# Patient Record
Sex: FEMALE | Race: WHITE | NOT HISPANIC OR LATINO | Employment: OTHER | URBAN - METROPOLITAN AREA
[De-identification: names, ages, dates, MRNs, and addresses within clinical notes are randomized per-mention and may not be internally consistent; named-entity substitution may affect disease eponyms.]

---

## 2018-07-24 ENCOUNTER — OFFICE VISIT (OUTPATIENT)
Dept: URGENT CARE | Facility: CLINIC | Age: 73
End: 2018-07-24
Payer: MEDICARE

## 2018-07-24 VITALS
TEMPERATURE: 98 F | SYSTOLIC BLOOD PRESSURE: 140 MMHG | HEART RATE: 60 BPM | BODY MASS INDEX: 45.8 KG/M2 | OXYGEN SATURATION: 97 % | HEIGHT: 66 IN | RESPIRATION RATE: 14 BRPM | DIASTOLIC BLOOD PRESSURE: 66 MMHG | WEIGHT: 285 LBS

## 2018-07-24 DIAGNOSIS — H92.01 OTALGIA, RIGHT EAR: Primary | ICD-10-CM

## 2018-07-24 DIAGNOSIS — H60.521: ICD-10-CM

## 2018-07-24 PROCEDURE — 99213 OFFICE O/P EST LOW 20 MIN: CPT | Mod: S$GLB,,, | Performed by: FAMILY MEDICINE

## 2018-07-24 RX ORDER — LISINOPRIL 10 MG/1
20 TABLET ORAL
COMMUNITY
Start: 2018-07-16 | End: 2021-05-06 | Stop reason: SDUPTHER

## 2018-07-24 RX ORDER — NEOMYCIN SULFATE, POLYMYXIN B SULFATE, HYDROCORTISONE 3.5; 10000; 1 MG/ML; [USP'U]/ML; MG/ML
3 SOLUTION/ DROPS AURICULAR (OTIC) 3 TIMES DAILY
Qty: 10 ML | Refills: 0 | Status: SHIPPED | OUTPATIENT
Start: 2018-07-24 | End: 2018-08-03

## 2018-07-24 RX ORDER — ALLOPURINOL 300 MG/1
100 TABLET ORAL
COMMUNITY
Start: 2018-07-16 | End: 2021-05-06 | Stop reason: SDUPTHER

## 2018-07-24 RX ORDER — TIZANIDINE 4 MG/1
TABLET ORAL
COMMUNITY
Start: 2018-07-16 | End: 2021-05-06 | Stop reason: SDUPTHER

## 2018-07-24 RX ORDER — TRAMADOL HYDROCHLORIDE 50 MG/1
TABLET ORAL
COMMUNITY
Start: 2018-07-12 | End: 2023-11-10

## 2018-07-24 RX ORDER — TRIAMTERENE AND HYDROCHLOROTHIAZIDE 37.5; 25 MG/1; MG/1
CAPSULE ORAL
COMMUNITY
Start: 2018-07-16 | End: 2021-05-06 | Stop reason: SDUPTHER

## 2018-07-24 RX ORDER — NABUMETONE 750 MG/1
TABLET, FILM COATED ORAL
COMMUNITY
Start: 2018-07-16 | End: 2022-01-14 | Stop reason: SDUPTHER

## 2018-07-24 RX ORDER — LEVOTHYROXINE SODIUM 175 UG/1
TABLET ORAL
COMMUNITY
Start: 2018-07-16 | End: 2021-05-06 | Stop reason: SDUPTHER

## 2018-07-24 RX ORDER — PANTOPRAZOLE SODIUM 40 MG/1
TABLET, DELAYED RELEASE ORAL
COMMUNITY
Start: 2018-07-06 | End: 2021-05-06 | Stop reason: ALTCHOICE

## 2018-07-24 RX ORDER — PAROXETINE HYDROCHLORIDE 20 MG/1
TABLET, FILM COATED ORAL
COMMUNITY
Start: 2018-06-18 | End: 2021-05-06 | Stop reason: SDUPTHER

## 2018-07-24 RX ORDER — CLINDAMYCIN HYDROCHLORIDE 300 MG/1
CAPSULE ORAL
Refills: 1 | COMMUNITY
Start: 2018-07-18 | End: 2021-05-06 | Stop reason: ALTCHOICE

## 2018-07-24 NOTE — PROGRESS NOTES
"Subjective:       Patient ID: Roxanne Caballero is a 73 y.o. female.    Vitals:  height is 5' 6" (1.676 m) and weight is 129.3 kg (285 lb). Her oral temperature is 98 °F (36.7 °C). Her blood pressure is 140/66 (abnormal) and her pulse is 60. Her respiration is 14 and oxygen saturation is 97%.     Chief Complaint: Otalgia    R ear pain and swelling for 5 days. Pt taking Clindamycin for infected R tooth.      Otalgia    There is pain in the right ear. This is a new problem. The current episode started in the past 7 days. The problem occurs constantly. The problem has been unchanged. There has been no fever. The pain is at a severity of 3/10. The pain is mild. Associated symptoms include hearing loss. Pertinent negatives include no abdominal pain, diarrhea, headaches, rash, sore throat or vomiting. She has tried antibiotics for the symptoms. The treatment provided no relief. There is no history of a chronic ear infection, hearing loss or a tympanostomy tube.     Review of Systems   Constitution: Negative for chills and fever.   HENT: Positive for ear pain and hearing loss. Negative for sore throat.    Eyes: Negative for blurred vision.   Cardiovascular: Negative for chest pain.   Respiratory: Negative for shortness of breath.    Skin: Negative for rash.   Musculoskeletal: Negative for back pain and joint pain.   Gastrointestinal: Negative for abdominal pain, diarrhea, nausea and vomiting.   Neurological: Negative for headaches.   Psychiatric/Behavioral: The patient is not nervous/anxious.        Objective:      Physical Exam   Constitutional: She is oriented to person, place, and time. She appears well-developed and well-nourished. She is cooperative.  Non-toxic appearance. She does not appear ill. No distress.   HENT:   Head: Normocephalic and atraumatic.   Right Ear: Hearing, tympanic membrane, external ear and ear canal normal.   Left Ear: Hearing, tympanic membrane, external ear and ear canal normal.   Nose: Nose " normal. No mucosal edema, rhinorrhea or nasal deformity. No epistaxis. Right sinus exhibits no maxillary sinus tenderness and no frontal sinus tenderness. Left sinus exhibits no maxillary sinus tenderness and no frontal sinus tenderness.   Mouth/Throat: Uvula is midline, oropharynx is clear and moist and mucous membranes are normal. No trismus in the jaw. Normal dentition. No uvula swelling. No posterior oropharyngeal erythema.   Right ear canal edematous tm not visible  Left ear canal normal  TM normal   Eyes: Conjunctivae and lids are normal. Right eye exhibits no discharge. Left eye exhibits no discharge. No scleral icterus.   Sclera clear bilat   Neck: Trachea normal, normal range of motion, full passive range of motion without pain and phonation normal. Neck supple.   Cardiovascular: Normal rate, regular rhythm, normal heart sounds, intact distal pulses and normal pulses.    Pulmonary/Chest: Effort normal and breath sounds normal. No respiratory distress.   Abdominal: Soft. Normal appearance and bowel sounds are normal. She exhibits no distension, no pulsatile midline mass and no mass. There is no tenderness.   Musculoskeletal: Normal range of motion. She exhibits no edema or deformity.   Neurological: She is alert and oriented to person, place, and time. She exhibits normal muscle tone. Coordination normal.   Skin: Skin is warm, dry and intact. She is not diaphoretic. No pallor.   Psychiatric: She has a normal mood and affect. Her speech is normal and behavior is normal. Judgment and thought content normal. Cognition and memory are normal.   Nursing note and vitals reviewed.      Assessment:       1. Otalgia, right ear    2. Acute chemical otitis externa, right        Plan:         Otalgia, right ear  -     neomycin-polymyxin-hydrocortisone (CORTISPORIN) otic solution; Place 3 drops into both ears 3 (three) times daily. for 10 days  Dispense: 10 mL; Refill: 0    Acute chemical otitis externa, right  -      neomycin-polymyxin-hydrocortisone (CORTISPORIN) otic solution; Place 3 drops into both ears 3 (three) times daily. for 10 days  Dispense: 10 mL; Refill: 0        Warm compresses

## 2018-07-24 NOTE — PATIENT INSTRUCTIONS

## 2021-03-05 ENCOUNTER — IMMUNIZATION (OUTPATIENT)
Dept: PRIMARY CARE CLINIC | Facility: CLINIC | Age: 76
End: 2021-03-05
Payer: MEDICARE

## 2021-03-05 DIAGNOSIS — Z23 NEED FOR VACCINATION: Primary | ICD-10-CM

## 2021-03-05 PROCEDURE — 0031A PR IMMUNIZ ADMIN, SARS-COV-2 COVID-19 VACC, 5X10VP/0.5ML: CPT | Mod: CV19,S$GLB,, | Performed by: INTERNAL MEDICINE

## 2021-03-05 PROCEDURE — 91303 PR SARSCOV2 VAC AD26 .5ML IM: CPT | Mod: S$GLB,,, | Performed by: INTERNAL MEDICINE

## 2021-03-05 PROCEDURE — 0031A PR IMMUNIZ ADMIN, SARS-COV-2 COVID-19 VACC, 5X10VP/0.5ML: ICD-10-PCS | Mod: CV19,S$GLB,, | Performed by: INTERNAL MEDICINE

## 2021-03-05 PROCEDURE — 91303 PR SARSCOV2 VAC AD26 .5ML IM: ICD-10-PCS | Mod: S$GLB,,, | Performed by: INTERNAL MEDICINE

## 2021-05-06 ENCOUNTER — OFFICE VISIT (OUTPATIENT)
Dept: FAMILY MEDICINE | Facility: CLINIC | Age: 76
End: 2021-05-06
Payer: MEDICARE

## 2021-05-06 ENCOUNTER — HOSPITAL ENCOUNTER (OUTPATIENT)
Dept: RADIOLOGY | Facility: HOSPITAL | Age: 76
Discharge: HOME | End: 2021-05-06
Attending: STUDENT IN AN ORGANIZED HEALTH CARE EDUCATION/TRAINING PROGRAM
Payer: MEDICARE

## 2021-05-06 VITALS
BODY MASS INDEX: 46.67 KG/M2 | OXYGEN SATURATION: 95 % | DIASTOLIC BLOOD PRESSURE: 60 MMHG | WEIGHT: 290.38 LBS | HEART RATE: 86 BPM | SYSTOLIC BLOOD PRESSURE: 130 MMHG | HEIGHT: 66 IN

## 2021-05-06 DIAGNOSIS — M25.511 CHRONIC RIGHT SHOULDER PAIN: ICD-10-CM

## 2021-05-06 DIAGNOSIS — M10.9 GOUT, UNSPECIFIED CAUSE, UNSPECIFIED CHRONICITY, UNSPECIFIED SITE: ICD-10-CM

## 2021-05-06 DIAGNOSIS — I10 ESSENTIAL HYPERTENSION: ICD-10-CM

## 2021-05-06 DIAGNOSIS — Z11.59 NEED FOR HEPATITIS C SCREENING TEST: ICD-10-CM

## 2021-05-06 DIAGNOSIS — E78.5 HYPERLIPIDEMIA, UNSPECIFIED HYPERLIPIDEMIA TYPE: ICD-10-CM

## 2021-05-06 DIAGNOSIS — G89.29 CHRONIC RIGHT SHOULDER PAIN: ICD-10-CM

## 2021-05-06 DIAGNOSIS — M19.90 OSTEOARTHRITIS, UNSPECIFIED OSTEOARTHRITIS TYPE, UNSPECIFIED SITE: ICD-10-CM

## 2021-05-06 DIAGNOSIS — Z76.89 ESTABLISHING CARE WITH NEW DOCTOR, ENCOUNTER FOR: Primary | ICD-10-CM

## 2021-05-06 DIAGNOSIS — F41.9 ANXIETY: ICD-10-CM

## 2021-05-06 DIAGNOSIS — E03.9 HYPOTHYROIDISM, UNSPECIFIED TYPE: ICD-10-CM

## 2021-05-06 PROCEDURE — 3288F FALL RISK ASSESSMENT DOCD: CPT | Mod: CPTII,S$GLB,, | Performed by: STUDENT IN AN ORGANIZED HEALTH CARE EDUCATION/TRAINING PROGRAM

## 2021-05-06 PROCEDURE — 99204 OFFICE O/P NEW MOD 45 MIN: CPT | Mod: S$GLB,,, | Performed by: STUDENT IN AN ORGANIZED HEALTH CARE EDUCATION/TRAINING PROGRAM

## 2021-05-06 PROCEDURE — 1125F AMNT PAIN NOTED PAIN PRSNT: CPT | Mod: S$GLB,,, | Performed by: STUDENT IN AN ORGANIZED HEALTH CARE EDUCATION/TRAINING PROGRAM

## 2021-05-06 PROCEDURE — 99204 PR OFFICE/OUTPT VISIT, NEW, LEVL IV, 45-59 MIN: ICD-10-PCS | Mod: S$GLB,,, | Performed by: STUDENT IN AN ORGANIZED HEALTH CARE EDUCATION/TRAINING PROGRAM

## 2021-05-06 PROCEDURE — 1125F PR PAIN SEVERITY QUANTIFIED, PAIN PRESENT: ICD-10-PCS | Mod: S$GLB,,, | Performed by: STUDENT IN AN ORGANIZED HEALTH CARE EDUCATION/TRAINING PROGRAM

## 2021-05-06 PROCEDURE — 99999 PR PBB SHADOW E&M-EST. PATIENT-LVL IV: CPT | Mod: PBBFAC,,, | Performed by: STUDENT IN AN ORGANIZED HEALTH CARE EDUCATION/TRAINING PROGRAM

## 2021-05-06 PROCEDURE — 99499 RISK ADDL DX/OHS AUDIT: ICD-10-PCS | Mod: S$GLB,,, | Performed by: STUDENT IN AN ORGANIZED HEALTH CARE EDUCATION/TRAINING PROGRAM

## 2021-05-06 PROCEDURE — 73030 X-RAY EXAM OF SHOULDER: CPT | Mod: 26,RT,, | Performed by: RADIOLOGY

## 2021-05-06 PROCEDURE — 1159F PR MEDICATION LIST DOCUMENTED IN MEDICAL RECORD: ICD-10-PCS | Mod: S$GLB,,, | Performed by: STUDENT IN AN ORGANIZED HEALTH CARE EDUCATION/TRAINING PROGRAM

## 2021-05-06 PROCEDURE — 1159F MED LIST DOCD IN RCRD: CPT | Mod: S$GLB,,, | Performed by: STUDENT IN AN ORGANIZED HEALTH CARE EDUCATION/TRAINING PROGRAM

## 2021-05-06 PROCEDURE — 3288F PR FALLS RISK ASSESSMENT DOCUMENTED: ICD-10-PCS | Mod: CPTII,S$GLB,, | Performed by: STUDENT IN AN ORGANIZED HEALTH CARE EDUCATION/TRAINING PROGRAM

## 2021-05-06 PROCEDURE — 73030 XR SHOULDER TRAUMA 3 VIEW RIGHT: ICD-10-PCS | Mod: 26,RT,, | Performed by: RADIOLOGY

## 2021-05-06 PROCEDURE — 99999 PR PBB SHADOW E&M-EST. PATIENT-LVL IV: ICD-10-PCS | Mod: PBBFAC,,, | Performed by: STUDENT IN AN ORGANIZED HEALTH CARE EDUCATION/TRAINING PROGRAM

## 2021-05-06 PROCEDURE — 1101F PR PT FALLS ASSESS DOC 0-1 FALLS W/OUT INJ PAST YR: ICD-10-PCS | Mod: CPTII,S$GLB,, | Performed by: STUDENT IN AN ORGANIZED HEALTH CARE EDUCATION/TRAINING PROGRAM

## 2021-05-06 PROCEDURE — 73030 X-RAY EXAM OF SHOULDER: CPT | Mod: TC,FY,PO,RT

## 2021-05-06 PROCEDURE — 1101F PT FALLS ASSESS-DOCD LE1/YR: CPT | Mod: CPTII,S$GLB,, | Performed by: STUDENT IN AN ORGANIZED HEALTH CARE EDUCATION/TRAINING PROGRAM

## 2021-05-06 PROCEDURE — 99499 UNLISTED E&M SERVICE: CPT | Mod: S$GLB,,, | Performed by: STUDENT IN AN ORGANIZED HEALTH CARE EDUCATION/TRAINING PROGRAM

## 2021-05-06 RX ORDER — ALLOPURINOL 300 MG/1
150 TABLET ORAL DAILY
Qty: 30 TABLET | Refills: 1 | Status: SHIPPED | OUTPATIENT
Start: 2021-05-06 | End: 2021-06-16

## 2021-05-06 RX ORDER — LISINOPRIL 20 MG/1
20 TABLET ORAL DAILY
Qty: 90 TABLET | Refills: 2 | Status: SHIPPED | OUTPATIENT
Start: 2021-05-06 | End: 2022-01-14 | Stop reason: SDUPTHER

## 2021-05-06 RX ORDER — TIZANIDINE 4 MG/1
4 TABLET ORAL DAILY
Qty: 30 TABLET | Refills: 1 | Status: SHIPPED | OUTPATIENT
Start: 2021-05-06 | End: 2021-06-28

## 2021-05-06 RX ORDER — OMEPRAZOLE 20 MG/1
20 CAPSULE, DELAYED RELEASE ORAL DAILY
COMMUNITY

## 2021-05-06 RX ORDER — TRIAMTERENE AND HYDROCHLOROTHIAZIDE 37.5; 25 MG/1; MG/1
1 CAPSULE ORAL DAILY
Qty: 90 CAPSULE | Refills: 1 | Status: SHIPPED | OUTPATIENT
Start: 2021-05-06 | End: 2021-10-16

## 2021-05-06 RX ORDER — PAROXETINE HYDROCHLORIDE 20 MG/1
20 TABLET, FILM COATED ORAL DAILY
Qty: 30 TABLET | Refills: 2 | Status: SHIPPED | OUTPATIENT
Start: 2021-05-06 | End: 2021-07-19

## 2021-05-06 RX ORDER — LEVOTHYROXINE SODIUM 175 UG/1
175 TABLET ORAL DAILY
Qty: 90 TABLET | Refills: 2 | Status: SHIPPED | OUTPATIENT
Start: 2021-05-06 | End: 2022-01-14 | Stop reason: SDUPTHER

## 2021-05-07 ENCOUNTER — LAB VISIT (OUTPATIENT)
Dept: LAB | Facility: HOSPITAL | Age: 76
End: 2021-05-07
Attending: STUDENT IN AN ORGANIZED HEALTH CARE EDUCATION/TRAINING PROGRAM
Payer: MEDICARE

## 2021-05-07 DIAGNOSIS — E78.5 HYPERLIPIDEMIA, UNSPECIFIED HYPERLIPIDEMIA TYPE: ICD-10-CM

## 2021-05-07 DIAGNOSIS — I10 ESSENTIAL HYPERTENSION: ICD-10-CM

## 2021-05-07 DIAGNOSIS — Z11.59 NEED FOR HEPATITIS C SCREENING TEST: ICD-10-CM

## 2021-05-07 LAB
ALBUMIN SERPL BCP-MCNC: 3.6 G/DL (ref 3.5–5.2)
ALP SERPL-CCNC: 111 U/L (ref 55–135)
ALT SERPL W/O P-5'-P-CCNC: 18 U/L (ref 10–44)
ANION GAP SERPL CALC-SCNC: 9 MMOL/L (ref 8–16)
AST SERPL-CCNC: 28 U/L (ref 10–40)
BILIRUB SERPL-MCNC: 0.4 MG/DL (ref 0.1–1)
BUN SERPL-MCNC: 41 MG/DL (ref 8–23)
CALCIUM SERPL-MCNC: 9.9 MG/DL (ref 8.7–10.5)
CHLORIDE SERPL-SCNC: 103 MMOL/L (ref 95–110)
CHOLEST SERPL-MCNC: 156 MG/DL (ref 120–199)
CHOLEST/HDLC SERPL: 3.2 {RATIO} (ref 2–5)
CO2 SERPL-SCNC: 25 MMOL/L (ref 23–29)
CREAT SERPL-MCNC: 2.1 MG/DL (ref 0.5–1.4)
EST. GFR  (AFRICAN AMERICAN): 25.4 ML/MIN/1.73 M^2
EST. GFR  (NON AFRICAN AMERICAN): 22.1 ML/MIN/1.73 M^2
GLUCOSE SERPL-MCNC: 99 MG/DL (ref 70–110)
HDLC SERPL-MCNC: 49 MG/DL (ref 40–75)
HDLC SERPL: 31.4 % (ref 20–50)
LDLC SERPL CALC-MCNC: 80.8 MG/DL (ref 63–159)
NONHDLC SERPL-MCNC: 107 MG/DL
POTASSIUM SERPL-SCNC: 5.1 MMOL/L (ref 3.5–5.1)
PROT SERPL-MCNC: 7.7 G/DL (ref 6–8.4)
SODIUM SERPL-SCNC: 137 MMOL/L (ref 136–145)
TRIGL SERPL-MCNC: 131 MG/DL (ref 30–150)

## 2021-05-07 PROCEDURE — 86803 HEPATITIS C AB TEST: CPT | Performed by: STUDENT IN AN ORGANIZED HEALTH CARE EDUCATION/TRAINING PROGRAM

## 2021-05-07 PROCEDURE — 80061 LIPID PANEL: CPT | Performed by: STUDENT IN AN ORGANIZED HEALTH CARE EDUCATION/TRAINING PROGRAM

## 2021-05-07 PROCEDURE — 36415 COLL VENOUS BLD VENIPUNCTURE: CPT | Mod: PO | Performed by: STUDENT IN AN ORGANIZED HEALTH CARE EDUCATION/TRAINING PROGRAM

## 2021-05-07 PROCEDURE — 80053 COMPREHEN METABOLIC PANEL: CPT | Performed by: STUDENT IN AN ORGANIZED HEALTH CARE EDUCATION/TRAINING PROGRAM

## 2021-05-10 ENCOUNTER — TELEPHONE (OUTPATIENT)
Dept: FAMILY MEDICINE | Facility: CLINIC | Age: 76
End: 2021-05-10

## 2021-05-10 LAB — HCV AB SERPL QL IA: NEGATIVE

## 2021-06-14 ENCOUNTER — HOSPITAL ENCOUNTER (OUTPATIENT)
Dept: RADIOLOGY | Facility: HOSPITAL | Age: 76
Discharge: HOME | End: 2021-06-14
Attending: STUDENT IN AN ORGANIZED HEALTH CARE EDUCATION/TRAINING PROGRAM
Payer: MEDICARE

## 2021-06-14 ENCOUNTER — OFFICE VISIT (OUTPATIENT)
Dept: SPORTS MEDICINE | Facility: CLINIC | Age: 76
End: 2021-06-14
Payer: MEDICARE

## 2021-06-14 VITALS
BODY MASS INDEX: 46.67 KG/M2 | HEIGHT: 66 IN | WEIGHT: 290.38 LBS | DIASTOLIC BLOOD PRESSURE: 62 MMHG | SYSTOLIC BLOOD PRESSURE: 111 MMHG

## 2021-06-14 DIAGNOSIS — M25.511 CHRONIC RIGHT SHOULDER PAIN: Primary | ICD-10-CM

## 2021-06-14 DIAGNOSIS — M25.512 ACUTE PAIN OF LEFT SHOULDER: ICD-10-CM

## 2021-06-14 DIAGNOSIS — M19.012 ARTHRITIS OF LEFT GLENOHUMERAL JOINT: ICD-10-CM

## 2021-06-14 DIAGNOSIS — M75.42 SUBACROMIAL IMPINGEMENT, LEFT: ICD-10-CM
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
## 2021-06-14 DIAGNOSIS — G89.29 CHRONIC RIGHT SHOULDER PAIN: Primary | ICD-10-CM

## 2021-06-14 DIAGNOSIS — M19.011 ARTHRITIS OF RIGHT GLENOHUMERAL JOINT: ICD-10-CM

## 2021-06-14 PROCEDURE — 99204 PR OFFICE/OUTPT VISIT, NEW, LEVL IV, 45-59 MIN: ICD-10-PCS | Mod: 25,S$GLB,, | Performed by: STUDENT IN AN ORGANIZED HEALTH CARE EDUCATION/TRAINING PROGRAM

## 2021-06-14 PROCEDURE — 20611 LARGE JOINT ASPIRATION/INJECTION: R GLENOHUMERAL: ICD-10-PCS | Mod: RT,S$GLB,, | Performed by: STUDENT IN AN ORGANIZED HEALTH CARE EDUCATION/TRAINING PROGRAM

## 2021-06-14 PROCEDURE — 99204 OFFICE O/P NEW MOD 45 MIN: CPT | Mod: 25,S$GLB,, | Performed by: STUDENT IN AN ORGANIZED HEALTH CARE EDUCATION/TRAINING PROGRAM

## 2021-06-14 PROCEDURE — 73030 X-RAY EXAM OF SHOULDER: CPT | Mod: TC,PO,LT

## 2021-06-14 PROCEDURE — 73030 XR SHOULDER COMPLETE 2 OR MORE VIEWS LEFT: ICD-10-PCS | Mod: 26,LT,, | Performed by: RADIOLOGY

## 2021-06-14 PROCEDURE — 97110 PR THERAPEUTIC EXERCISES: ICD-10-PCS | Mod: GP,S$GLB,, | Performed by: STUDENT IN AN ORGANIZED HEALTH CARE EDUCATION/TRAINING PROGRAM

## 2021-06-14 PROCEDURE — 97110 THERAPEUTIC EXERCISES: CPT | Mod: GP,S$GLB,, | Performed by: STUDENT IN AN ORGANIZED HEALTH CARE EDUCATION/TRAINING PROGRAM

## 2021-06-14 PROCEDURE — 99999 PR PBB SHADOW E&M-EST. PATIENT-LVL III: ICD-10-PCS | Mod: PBBFAC,,, | Performed by: STUDENT IN AN ORGANIZED HEALTH CARE EDUCATION/TRAINING PROGRAM

## 2021-06-14 PROCEDURE — 1159F MED LIST DOCD IN RCRD: CPT | Mod: S$GLB,,, | Performed by: STUDENT IN AN ORGANIZED HEALTH CARE EDUCATION/TRAINING PROGRAM

## 2021-06-14 PROCEDURE — 73030 X-RAY EXAM OF SHOULDER: CPT | Mod: 26,LT,, | Performed by: RADIOLOGY

## 2021-06-14 PROCEDURE — 1159F PR MEDICATION LIST DOCUMENTED IN MEDICAL RECORD: ICD-10-PCS | Mod: S$GLB,,, | Performed by: STUDENT IN AN ORGANIZED HEALTH CARE EDUCATION/TRAINING PROGRAM

## 2021-06-14 PROCEDURE — 99999 PR PBB SHADOW E&M-EST. PATIENT-LVL III: CPT | Mod: PBBFAC,,, | Performed by: STUDENT IN AN ORGANIZED HEALTH CARE EDUCATION/TRAINING PROGRAM

## 2021-06-14 PROCEDURE — 20611 DRAIN/INJ JOINT/BURSA W/US: CPT | Mod: RT,S$GLB,, | Performed by: STUDENT IN AN ORGANIZED HEALTH CARE EDUCATION/TRAINING PROGRAM

## 2021-06-14 RX ADMIN — TRIAMCINOLONE ACETONIDE 40 MG: 40 INJECTION, SUSPENSION INTRA-ARTICULAR; INTRAMUSCULAR at 02:06

## 2021-06-16 RX ORDER — TRIAMCINOLONE ACETONIDE 40 MG/ML
40 INJECTION, SUSPENSION INTRA-ARTICULAR; INTRAMUSCULAR
Status: DISCONTINUED | OUTPATIENT
Start: 2021-06-14 | End: 2021-06-16 | Stop reason: HOSPADM

## 2021-12-08 ENCOUNTER — TELEPHONE (OUTPATIENT)
Dept: INTERNAL MEDICINE | Facility: CLINIC | Age: 76
End: 2021-12-08
Payer: MEDICARE

## 2021-12-28 ENCOUNTER — TELEPHONE (OUTPATIENT)
Dept: FAMILY MEDICINE | Facility: CLINIC | Age: 76
End: 2021-12-28
Payer: MEDICARE

## 2022-01-14 ENCOUNTER — LAB VISIT (OUTPATIENT)
Dept: LAB | Facility: HOSPITAL | Age: 77
End: 2022-01-14
Attending: FAMILY MEDICINE
Payer: MEDICARE

## 2022-01-14 ENCOUNTER — OFFICE VISIT (OUTPATIENT)
Dept: FAMILY MEDICINE | Facility: CLINIC | Age: 77
End: 2022-01-14
Payer: MEDICARE

## 2022-01-14 VITALS
BODY MASS INDEX: 45.24 KG/M2 | HEIGHT: 66 IN | HEART RATE: 74 BPM | OXYGEN SATURATION: 95 % | WEIGHT: 281.5 LBS | DIASTOLIC BLOOD PRESSURE: 60 MMHG | SYSTOLIC BLOOD PRESSURE: 124 MMHG

## 2022-01-14 DIAGNOSIS — N18.4 HYPERTENSIVE KIDNEY DISEASE WITH STAGE 4 CHRONIC KIDNEY DISEASE: ICD-10-CM

## 2022-01-14 DIAGNOSIS — G89.29 CHRONIC RIGHT SHOULDER PAIN: ICD-10-CM

## 2022-01-14 DIAGNOSIS — N18.4 CKD (CHRONIC KIDNEY DISEASE) STAGE 4, GFR 15-29 ML/MIN: ICD-10-CM

## 2022-01-14 DIAGNOSIS — I12.9 HYPERTENSIVE KIDNEY DISEASE WITH STAGE 4 CHRONIC KIDNEY DISEASE: ICD-10-CM

## 2022-01-14 DIAGNOSIS — F41.9 ANXIETY: ICD-10-CM

## 2022-01-14 DIAGNOSIS — E03.9 HYPOTHYROIDISM, UNSPECIFIED TYPE: ICD-10-CM

## 2022-01-14 DIAGNOSIS — J44.9 CHRONIC OBSTRUCTIVE PULMONARY DISEASE, UNSPECIFIED COPD TYPE: ICD-10-CM

## 2022-01-14 DIAGNOSIS — E66.01 CLASS 3 SEVERE OBESITY DUE TO EXCESS CALORIES WITH SERIOUS COMORBIDITY AND BODY MASS INDEX (BMI) OF 45.0 TO 49.9 IN ADULT: ICD-10-CM

## 2022-01-14 DIAGNOSIS — M25.511 CHRONIC RIGHT SHOULDER PAIN: ICD-10-CM

## 2022-01-14 DIAGNOSIS — M19.90 OSTEOARTHRITIS, UNSPECIFIED OSTEOARTHRITIS TYPE, UNSPECIFIED SITE: ICD-10-CM

## 2022-01-14 DIAGNOSIS — Z23 IMMUNIZATION DUE: ICD-10-CM

## 2022-01-14 DIAGNOSIS — K04.7 CHRONIC DENTAL INFECTION: ICD-10-CM

## 2022-01-14 DIAGNOSIS — Z00.01 ENCOUNTER FOR GENERAL ADULT MEDICAL EXAMINATION WITH ABNORMAL FINDINGS: Primary | ICD-10-CM

## 2022-01-14 PROBLEM — N18.30 STAGE 3 CHRONIC KIDNEY DISEASE: Status: ACTIVE | Noted: 2022-01-14

## 2022-01-14 PROBLEM — M10.9 GOUT: Status: ACTIVE | Noted: 2022-01-14

## 2022-01-14 PROBLEM — F41.1 GENERALIZED ANXIETY DISORDER: Status: ACTIVE | Noted: 2022-01-14

## 2022-01-14 PROBLEM — I10 HYPERTENSION: Status: ACTIVE | Noted: 2022-01-14

## 2022-01-14 PROBLEM — E66.813 CLASS 3 SEVERE OBESITY DUE TO EXCESS CALORIES WITH SERIOUS COMORBIDITY AND BODY MASS INDEX (BMI) OF 45.0 TO 49.9 IN ADULT: Status: ACTIVE | Noted: 2022-01-14

## 2022-01-14 PROBLEM — M25.559 ARTHRALGIA OF HIP: Status: ACTIVE | Noted: 2022-01-14

## 2022-01-14 PROCEDURE — 3288F FALL RISK ASSESSMENT DOCD: CPT | Mod: HCNC,CPTII,S$GLB, | Performed by: FAMILY MEDICINE

## 2022-01-14 PROCEDURE — 84443 ASSAY THYROID STIM HORMONE: CPT | Mod: HCNC | Performed by: FAMILY MEDICINE

## 2022-01-14 PROCEDURE — 99499 UNLISTED E&M SERVICE: CPT | Mod: HCNC,S$GLB,, | Performed by: FAMILY MEDICINE

## 2022-01-14 PROCEDURE — 90694 FLU VACCINE - QUADRIVALENT - ADJUVANTED: ICD-10-PCS | Mod: HCNC,S$GLB,, | Performed by: FAMILY MEDICINE

## 2022-01-14 PROCEDURE — 3288F PR FALLS RISK ASSESSMENT DOCUMENTED: ICD-10-PCS | Mod: HCNC,CPTII,S$GLB, | Performed by: FAMILY MEDICINE

## 2022-01-14 PROCEDURE — 1125F AMNT PAIN NOTED PAIN PRSNT: CPT | Mod: HCNC,CPTII,S$GLB, | Performed by: FAMILY MEDICINE

## 2022-01-14 PROCEDURE — 80053 COMPREHEN METABOLIC PANEL: CPT | Mod: HCNC | Performed by: FAMILY MEDICINE

## 2022-01-14 PROCEDURE — G0008 ADMIN INFLUENZA VIRUS VAC: HCPCS | Mod: HCNC,S$GLB,, | Performed by: FAMILY MEDICINE

## 2022-01-14 PROCEDURE — 83036 HEMOGLOBIN GLYCOSYLATED A1C: CPT | Mod: HCNC | Performed by: FAMILY MEDICINE

## 2022-01-14 PROCEDURE — 99397 PR PREVENTIVE VISIT,EST,65 & OVER: ICD-10-PCS | Mod: HCNC,S$GLB,, | Performed by: FAMILY MEDICINE

## 2022-01-14 PROCEDURE — 99499 RISK ADDL DX/OHS AUDIT: ICD-10-PCS | Mod: HCNC,S$GLB,, | Performed by: FAMILY MEDICINE

## 2022-01-14 PROCEDURE — 3074F SYST BP LT 130 MM HG: CPT | Mod: HCNC,CPTII,S$GLB, | Performed by: FAMILY MEDICINE

## 2022-01-14 PROCEDURE — 1159F PR MEDICATION LIST DOCUMENTED IN MEDICAL RECORD: ICD-10-PCS | Mod: HCNC,CPTII,S$GLB, | Performed by: FAMILY MEDICINE

## 2022-01-14 PROCEDURE — 1159F MED LIST DOCD IN RCRD: CPT | Mod: HCNC,CPTII,S$GLB, | Performed by: FAMILY MEDICINE

## 2022-01-14 PROCEDURE — 99999 PR PBB SHADOW E&M-EST. PATIENT-LVL IV: CPT | Mod: PBBFAC,HCNC,, | Performed by: FAMILY MEDICINE

## 2022-01-14 PROCEDURE — 99999 PR PBB SHADOW E&M-EST. PATIENT-LVL IV: ICD-10-PCS | Mod: PBBFAC,HCNC,, | Performed by: FAMILY MEDICINE

## 2022-01-14 PROCEDURE — 90694 VACC AIIV4 NO PRSRV 0.5ML IM: CPT | Mod: HCNC,S$GLB,, | Performed by: FAMILY MEDICINE

## 2022-01-14 PROCEDURE — 3074F PR MOST RECENT SYSTOLIC BLOOD PRESSURE < 130 MM HG: ICD-10-PCS | Mod: HCNC,CPTII,S$GLB, | Performed by: FAMILY MEDICINE

## 2022-01-14 PROCEDURE — 1160F PR REVIEW ALL MEDS BY PRESCRIBER/CLIN PHARMACIST DOCUMENTED: ICD-10-PCS | Mod: HCNC,CPTII,S$GLB, | Performed by: FAMILY MEDICINE

## 2022-01-14 PROCEDURE — 1101F PT FALLS ASSESS-DOCD LE1/YR: CPT | Mod: HCNC,CPTII,S$GLB, | Performed by: FAMILY MEDICINE

## 2022-01-14 PROCEDURE — 3078F PR MOST RECENT DIASTOLIC BLOOD PRESSURE < 80 MM HG: ICD-10-PCS | Mod: HCNC,CPTII,S$GLB, | Performed by: FAMILY MEDICINE

## 2022-01-14 PROCEDURE — 1125F PR PAIN SEVERITY QUANTIFIED, PAIN PRESENT: ICD-10-PCS | Mod: HCNC,CPTII,S$GLB, | Performed by: FAMILY MEDICINE

## 2022-01-14 PROCEDURE — G0008 FLU VACCINE - QUADRIVALENT - ADJUVANTED: ICD-10-PCS | Mod: HCNC,S$GLB,, | Performed by: FAMILY MEDICINE

## 2022-01-14 PROCEDURE — 1101F PR PT FALLS ASSESS DOC 0-1 FALLS W/OUT INJ PAST YR: ICD-10-PCS | Mod: HCNC,CPTII,S$GLB, | Performed by: FAMILY MEDICINE

## 2022-01-14 PROCEDURE — 84439 ASSAY OF FREE THYROXINE: CPT | Mod: HCNC | Performed by: FAMILY MEDICINE

## 2022-01-14 PROCEDURE — 1160F RVW MEDS BY RX/DR IN RCRD: CPT | Mod: HCNC,CPTII,S$GLB, | Performed by: FAMILY MEDICINE

## 2022-01-14 PROCEDURE — 3078F DIAST BP <80 MM HG: CPT | Mod: HCNC,CPTII,S$GLB, | Performed by: FAMILY MEDICINE

## 2022-01-14 PROCEDURE — 99397 PER PM REEVAL EST PAT 65+ YR: CPT | Mod: HCNC,S$GLB,, | Performed by: FAMILY MEDICINE

## 2022-01-14 PROCEDURE — 36415 COLL VENOUS BLD VENIPUNCTURE: CPT | Mod: HCNC,PO | Performed by: FAMILY MEDICINE

## 2022-01-14 RX ORDER — ALBUTEROL SULFATE 90 UG/1
2 AEROSOL, METERED RESPIRATORY (INHALATION) EVERY 6 HOURS PRN
Qty: 18 G | Refills: 3 | Status: SHIPPED | OUTPATIENT
Start: 2022-01-14 | End: 2022-09-26 | Stop reason: SDUPTHER

## 2022-01-14 RX ORDER — TRIAMTERENE AND HYDROCHLOROTHIAZIDE 37.5; 25 MG/1; MG/1
1 CAPSULE ORAL DAILY
Qty: 90 CAPSULE | Refills: 3 | Status: SHIPPED | OUTPATIENT
Start: 2022-01-14 | End: 2023-02-16 | Stop reason: SDUPTHER

## 2022-01-14 RX ORDER — PAROXETINE HYDROCHLORIDE 20 MG/1
20 TABLET, FILM COATED ORAL DAILY
Qty: 90 TABLET | Refills: 3 | Status: SHIPPED | OUTPATIENT
Start: 2022-01-14 | End: 2022-12-01 | Stop reason: SDUPTHER

## 2022-01-14 RX ORDER — LEVOTHYROXINE SODIUM 175 UG/1
175 TABLET ORAL DAILY
Qty: 90 TABLET | Refills: 3 | Status: SHIPPED | OUTPATIENT
Start: 2022-01-14 | End: 2022-01-19 | Stop reason: SDUPTHER

## 2022-01-14 RX ORDER — CLINDAMYCIN HYDROCHLORIDE 300 MG/1
300 CAPSULE ORAL EVERY 12 HOURS
Qty: 20 CAPSULE | Refills: 0 | Status: SHIPPED | OUTPATIENT
Start: 2022-01-14 | End: 2022-01-24

## 2022-01-14 RX ORDER — CLINDAMYCIN HYDROCHLORIDE 150 MG/1
150 CAPSULE ORAL 3 TIMES DAILY
COMMUNITY
Start: 2021-12-22 | End: 2022-01-14 | Stop reason: SDUPTHER

## 2022-01-14 RX ORDER — ALLOPURINOL 300 MG/1
TABLET ORAL
COMMUNITY
End: 2022-09-29

## 2022-01-14 RX ORDER — TIZANIDINE 4 MG/1
4 TABLET ORAL DAILY
Qty: 90 TABLET | Refills: 3 | Status: SHIPPED | OUTPATIENT
Start: 2022-01-14 | End: 2022-12-01 | Stop reason: SDUPTHER

## 2022-01-14 RX ORDER — FLUTICASONE FUROATE AND VILANTEROL TRIFENATATE 100; 25 UG/1; UG/1
1 POWDER RESPIRATORY (INHALATION) DAILY
Qty: 60 EACH | Refills: 11 | Status: SHIPPED | OUTPATIENT
Start: 2022-01-14 | End: 2023-11-10

## 2022-01-14 RX ORDER — LISINOPRIL 20 MG/1
20 TABLET ORAL DAILY
Qty: 90 TABLET | Refills: 3 | Status: SHIPPED | OUTPATIENT
Start: 2022-01-14 | End: 2022-12-01 | Stop reason: SDUPTHER

## 2022-01-14 RX ORDER — NABUMETONE 750 MG/1
750 TABLET, FILM COATED ORAL DAILY
Qty: 90 TABLET | Refills: 3 | Status: SHIPPED | OUTPATIENT
Start: 2022-01-14 | End: 2022-12-01 | Stop reason: SDUPTHER

## 2022-01-14 NOTE — PROGRESS NOTES
Subjective:       Patient ID: Roxanne Caballero is a 76 y.o. female.    Chief Complaint: Establish Care    Patient Active Problem List   Diagnosis    Arthralgia of hip    Chronic obstructive pulmonary disease    Gout    Hypothyroidism    Hypertension    Class 3 severe obesity due to excess calories with serious comorbidity and body mass index (BMI) of 45.0 to 49.9 in adult    Stage 3 chronic kidney disease    Generalized anxiety disorder    CKD (chronic kidney disease) stage 4, GFR 15-29 ml/min    Hypertensive kidney disease with stage 4 chronic kidney disease      HPI  75 yo female presents to establish care.  Needs refills today.     Reviewed medical history.   Ambulates with walker. Needs rests. Aches and pain in joints/limbs limit her more but also winded and short of breath. History of COPD with prior history of smoking. No controller inhaler or albuterol use.     Review of Systems   All other systems reviewed and are negative.         Results for orders placed or performed in visit on 05/07/21   Comprehensive Metabolic Panel   Result Value Ref Range    Sodium 137 136 - 145 mmol/L    Potassium 5.1 3.5 - 5.1 mmol/L    Chloride 103 95 - 110 mmol/L    CO2 25 23 - 29 mmol/L    Glucose 99 70 - 110 mg/dL    BUN 41 (H) 8 - 23 mg/dL    Creatinine 2.1 (H) 0.5 - 1.4 mg/dL    Calcium 9.9 8.7 - 10.5 mg/dL    Total Protein 7.7 6.0 - 8.4 g/dL    Albumin 3.6 3.5 - 5.2 g/dL    Total Bilirubin 0.4 0.1 - 1.0 mg/dL    Alkaline Phosphatase 111 55 - 135 U/L    AST 28 10 - 40 U/L    ALT 18 10 - 44 U/L    Anion Gap 9 8 - 16 mmol/L    eGFR if African American 25.4 (A) >60 mL/min/1.73 m^2    eGFR if non  22.1 (A) >60 mL/min/1.73 m^2   Hepatitis C Antibody   Result Value Ref Range    Hepatitis C Ab Negative Negative   Lipid Panel   Result Value Ref Range    Cholesterol 156 120 - 199 mg/dL    Triglycerides 131 30 - 150 mg/dL    HDL 49 40 - 75 mg/dL    LDL Cholesterol 80.8 63.0 - 159.0 mg/dL    HDL/Cholesterol  Ratio 31.4 20.0 - 50.0 %    Total Cholesterol/HDL Ratio 3.2 2.0 - 5.0    Non-HDL Cholesterol 107 mg/dL       Objective:     Vitals:    01/14/22 1605   BP: 124/60   Pulse: 74        Physical Exam  Vitals and nursing note reviewed.   Constitutional:       General: She is not in acute distress.     Appearance: Normal appearance. She is obese. She is not ill-appearing, toxic-appearing or diaphoretic.   HENT:      Head: Normocephalic and atraumatic.   Eyes:      General: No scleral icterus.     Conjunctiva/sclera: Conjunctivae normal.   Cardiovascular:      Rate and Rhythm: Normal rate.      Heart sounds: Normal heart sounds.   Pulmonary:      Effort: Pulmonary effort is normal. No respiratory distress.      Breath sounds: Normal breath sounds.   Abdominal:      General: Bowel sounds are normal.   Skin:     Coloration: Skin is not pale.   Neurological:      Mental Status: She is alert. Mental status is at baseline.   Psychiatric:         Attention and Perception: Attention and perception normal.         Mood and Affect: Mood and affect normal.         Speech: Speech normal.         Behavior: Behavior normal.         Cognition and Memory: Cognition and memory normal.         Judgment: Judgment normal.         Assessment:       1. Encounter for general adult medical examination with abnormal findings    2. Hypertensive kidney disease with stage 4 chronic kidney disease    3. CKD (chronic kidney disease) stage 4, GFR 15-29 ml/min    4. Hypothyroidism, unspecified type    5. Anxiety    6. Chronic right shoulder pain    7. Osteoarthritis, unspecified osteoarthritis type, unspecified site    8. Chronic dental infection    9. Chronic obstructive pulmonary disease, unspecified COPD type    10. Class 3 severe obesity due to excess calories with serious comorbidity and body mass index (BMI) of 45.0 to 49.9 in adult    11. Immunization due          Plan:       - Chronic conditions stable, refills sent. Updated labs as  below    Encounter for general adult medical examination with abnormal findings  - Risk and age appropriate anticipatory guidance. HM reviewed and updated. Recommendations discussed with patient as appropriate.     Hypertensive kidney disease with stage 4 chronic kidney disease  -     lisinopriL (PRINIVIL,ZESTRIL) 20 MG tablet; Take 1 tablet (20 mg total) by mouth once daily.  Dispense: 90 tablet; Refill: 3  -     triamterene-hydrochlorothiazide 37.5-25 mg (DYAZIDE) 37.5-25 mg per capsule; Take 1 capsule by mouth once daily.  Dispense: 90 capsule; Refill: 3  -     Comprehensive Metabolic Panel; Future; Expected date: 01/14/2022  -     Hemoglobin A1C; Future; Expected date: 01/14/2022  -     URINALYSIS    CKD (chronic kidney disease) stage 4, GFR 15-29 ml/min  - Repeat CMP  - Establish with nephrology    Hypothyroidism, unspecified type  -     levothyroxine (SYNTHROID, LEVOTHROID) 175 MCG tablet; Take 1 tablet (175 mcg total) by mouth once daily.  Dispense: 90 tablet; Refill: 3  -     TSH; Future; Expected date: 01/14/2022    Anxiety  -     paroxetine (PAXIL) 20 MG tablet; Take 1 tablet (20 mg total) by mouth once daily.  Dispense: 90 tablet; Refill: 3    Chronic right shoulder pain  -     tiZANidine (ZANAFLEX) 4 MG tablet; Take 1 tablet (4 mg total) by mouth once daily.  Dispense: 90 tablet; Refill: 3    Osteoarthritis, unspecified osteoarthritis type, unspecified site  -     tiZANidine (ZANAFLEX) 4 MG tablet; Take 1 tablet (4 mg total) by mouth once daily.  Dispense: 90 tablet; Refill: 3    Chronic dental infection  -     clindamycin (CLEOCIN) 300 MG capsule; Take 1 capsule (300 mg total) by mouth every 12 (twelve) hours. for 10 days  Dispense: 20 capsule; Refill: 0  - Per patient intermittent flares.     Chronic obstructive pulmonary disease, unspecified COPD type  -     albuterol (VENTOLIN HFA) 90 mcg/actuation inhaler; Inhale 2 puffs into the lungs every 6 (six) hours as needed for Wheezing. Rescue  Dispense: 18  g; Refill: 3  -     fluticasone furoate-vilanteroL (BREO ELLIPTA) 100-25 mcg/dose diskus inhaler; Inhale 1 puff into the lungs once daily. Controller  Dispense: 60 each; Refill: 11  - Start on controller inhaler to see if that with regular exercise will improve pulm endurance/ capacity    Class 3 severe obesity due to excess calories with serious comorbidity and body mass index (BMI) of 45.0 to 49.9 in adult  -     Hemoglobin A1C; Future; Expected date: 01/14/2022    Immunization due  -     Influenza (FLUAD) - Quadrivalent (Adjuvanted) *Preferred* (65+) (PF)    Other orders  -     nabumetone (RELAFEN) 750 MG tablet; Take 1 tablet (750 mg total) by mouth once daily.  Dispense: 90 tablet; Refill: 3      Patient's questions answered. Plan reviewed with patient at the end of visit. Relevant precautions to chief complaint and reasons to seek medical care or contact the office sooner reviewed with patient.     Follow up in about 4 months (around 5/14/2022).  Virtual visit in 2-4 weeks to review results

## 2022-01-15 LAB
ALBUMIN SERPL BCP-MCNC: 3.8 G/DL (ref 3.5–5.2)
ALP SERPL-CCNC: 130 U/L (ref 55–135)
ALT SERPL W/O P-5'-P-CCNC: 23 U/L (ref 10–44)
ANION GAP SERPL CALC-SCNC: 13 MMOL/L (ref 8–16)
AST SERPL-CCNC: 29 U/L (ref 10–40)
BILIRUB SERPL-MCNC: 0.4 MG/DL (ref 0.1–1)
BUN SERPL-MCNC: 37 MG/DL (ref 8–23)
CALCIUM SERPL-MCNC: 10 MG/DL (ref 8.7–10.5)
CHLORIDE SERPL-SCNC: 102 MMOL/L (ref 95–110)
CO2 SERPL-SCNC: 23 MMOL/L (ref 23–29)
CREAT SERPL-MCNC: 1.9 MG/DL (ref 0.5–1.4)
EST. GFR  (AFRICAN AMERICAN): 28.7 ML/MIN/1.73 M^2
EST. GFR  (NON AFRICAN AMERICAN): 24.9 ML/MIN/1.73 M^2
ESTIMATED AVG GLUCOSE: 108 MG/DL (ref 68–131)
GLUCOSE SERPL-MCNC: 108 MG/DL (ref 70–110)
HBA1C MFR BLD: 5.4 % (ref 4–5.6)
POTASSIUM SERPL-SCNC: 5 MMOL/L (ref 3.5–5.1)
PROT SERPL-MCNC: 7.8 G/DL (ref 6–8.4)
SODIUM SERPL-SCNC: 138 MMOL/L (ref 136–145)
T4 FREE SERPL-MCNC: 1.58 NG/DL (ref 0.71–1.51)
TSH SERPL DL<=0.005 MIU/L-ACNC: 0.17 UIU/ML (ref 0.4–4)

## 2022-01-19 ENCOUNTER — PATIENT MESSAGE (OUTPATIENT)
Dept: FAMILY MEDICINE | Facility: CLINIC | Age: 77
End: 2022-01-19
Payer: MEDICARE

## 2022-01-19 DIAGNOSIS — I12.9 HYPERTENSIVE KIDNEY DISEASE WITH STAGE 4 CHRONIC KIDNEY DISEASE: ICD-10-CM

## 2022-01-19 DIAGNOSIS — E03.9 HYPOTHYROIDISM, UNSPECIFIED TYPE: Primary | ICD-10-CM

## 2022-01-19 DIAGNOSIS — N18.4 HYPERTENSIVE KIDNEY DISEASE WITH STAGE 4 CHRONIC KIDNEY DISEASE: ICD-10-CM

## 2022-01-19 DIAGNOSIS — N18.4 CKD (CHRONIC KIDNEY DISEASE) STAGE 4, GFR 15-29 ML/MIN: ICD-10-CM

## 2022-01-19 PROBLEM — I10 HYPERTENSION: Status: RESOLVED | Noted: 2022-01-14 | Resolved: 2022-01-19

## 2022-01-19 PROBLEM — N18.30 STAGE 3 CHRONIC KIDNEY DISEASE: Status: RESOLVED | Noted: 2022-01-14 | Resolved: 2022-01-19

## 2022-01-19 RX ORDER — LEVOTHYROXINE SODIUM 150 UG/1
150 TABLET ORAL DAILY
Qty: 90 TABLET | Refills: 0 | Status: SHIPPED | OUTPATIENT
Start: 2022-01-19 | End: 2022-02-14 | Stop reason: SDUPTHER

## 2022-01-19 NOTE — TELEPHONE ENCOUNTER
Please notify patient that based on her latest labs, we should reduce her thyroid medication dose to 150mcg from 175 mcg. Sent to pharmacy. Please switch to new dose. Plan to recheck her TSH in 8 weeks. Can book prelab in 8 weeks and move Feb follow up to 8 weeks from new dose start date.     Please schedule for nephrology appt.     Thank you!

## 2022-02-14 DIAGNOSIS — E03.9 HYPOTHYROIDISM, UNSPECIFIED TYPE: ICD-10-CM

## 2022-02-14 RX ORDER — LEVOTHYROXINE SODIUM 150 UG/1
150 TABLET ORAL DAILY
Qty: 90 TABLET | Refills: 0 | Status: SHIPPED | OUTPATIENT
Start: 2022-02-14 | End: 2022-05-05 | Stop reason: SDUPTHER

## 2022-02-14 NOTE — TELEPHONE ENCOUNTER
No new care gaps identified.  Powered by [employer]. Reference number: [id number].   2/14/2022 2:00:20 PM CST

## 2022-05-05 ENCOUNTER — PATIENT MESSAGE (OUTPATIENT)
Dept: FAMILY MEDICINE | Facility: CLINIC | Age: 77
End: 2022-05-05
Payer: MEDICARE

## 2022-05-05 DIAGNOSIS — E03.9 HYPOTHYROIDISM, UNSPECIFIED TYPE: ICD-10-CM

## 2022-05-05 RX ORDER — LEVOTHYROXINE SODIUM 150 UG/1
150 TABLET ORAL DAILY
Qty: 30 TABLET | Refills: 0 | Status: SHIPPED | OUTPATIENT
Start: 2022-05-05 | End: 2022-09-07 | Stop reason: SDUPTHER

## 2022-05-05 NOTE — TELEPHONE ENCOUNTER
No new care gaps identified.  [facility] Embedded Care Gaps. Reference number: [id number]. 5/05/2022   8:51:07 AM CASSANDRAT

## 2022-05-13 ENCOUNTER — TELEPHONE (OUTPATIENT)
Dept: FAMILY MEDICINE | Facility: CLINIC | Age: 77
End: 2022-05-13
Payer: MEDICARE

## 2022-09-07 DIAGNOSIS — E03.9 HYPOTHYROIDISM, UNSPECIFIED TYPE: ICD-10-CM

## 2022-09-07 RX ORDER — LEVOTHYROXINE SODIUM 150 UG/1
150 TABLET ORAL DAILY
Qty: 30 TABLET | Refills: 0 | Status: SHIPPED | OUTPATIENT
Start: 2022-09-07 | End: 2022-09-29 | Stop reason: SDUPTHER

## 2022-09-26 ENCOUNTER — PATIENT MESSAGE (OUTPATIENT)
Dept: FAMILY MEDICINE | Facility: CLINIC | Age: 77
End: 2022-09-26
Payer: MEDICARE

## 2022-09-26 DIAGNOSIS — I12.9 HYPERTENSIVE KIDNEY DISEASE WITH STAGE 4 CHRONIC KIDNEY DISEASE: Primary | ICD-10-CM

## 2022-09-26 DIAGNOSIS — N18.4 HYPERTENSIVE KIDNEY DISEASE WITH STAGE 4 CHRONIC KIDNEY DISEASE: Primary | ICD-10-CM

## 2022-09-26 DIAGNOSIS — E03.9 HYPOTHYROIDISM, UNSPECIFIED TYPE: ICD-10-CM

## 2022-09-26 DIAGNOSIS — M10.9 GOUT, UNSPECIFIED CAUSE, UNSPECIFIED CHRONICITY, UNSPECIFIED SITE: ICD-10-CM

## 2022-09-26 DIAGNOSIS — J44.9 CHRONIC OBSTRUCTIVE PULMONARY DISEASE, UNSPECIFIED COPD TYPE: ICD-10-CM

## 2022-09-26 RX ORDER — ALBUTEROL SULFATE 90 UG/1
2 AEROSOL, METERED RESPIRATORY (INHALATION) EVERY 6 HOURS PRN
Qty: 18 G | Refills: 3 | Status: SHIPPED | OUTPATIENT
Start: 2022-09-26 | End: 2023-11-10

## 2022-09-29 RX ORDER — ALLOPURINOL 100 MG/1
100 TABLET ORAL DAILY
Qty: 90 TABLET | Refills: 0 | Status: SHIPPED | OUTPATIENT
Start: 2022-09-29 | End: 2022-10-11 | Stop reason: SDUPTHER

## 2022-09-29 RX ORDER — LEVOTHYROXINE SODIUM 150 UG/1
150 TABLET ORAL DAILY
Qty: 30 TABLET | Refills: 0 | Status: SHIPPED | OUTPATIENT
Start: 2022-09-29 | End: 2022-10-11 | Stop reason: SDUPTHER

## 2022-09-30 ENCOUNTER — PATIENT MESSAGE (OUTPATIENT)
Dept: FAMILY MEDICINE | Facility: CLINIC | Age: 77
End: 2022-09-30
Payer: MEDICARE

## 2022-10-05 ENCOUNTER — LAB VISIT (OUTPATIENT)
Dept: LAB | Facility: HOSPITAL | Age: 77
End: 2022-10-05
Attending: FAMILY MEDICINE
Payer: MEDICARE

## 2022-10-05 DIAGNOSIS — E03.9 HYPOTHYROIDISM, UNSPECIFIED TYPE: ICD-10-CM

## 2022-10-05 DIAGNOSIS — I12.9 HYPERTENSIVE KIDNEY DISEASE WITH STAGE 4 CHRONIC KIDNEY DISEASE: ICD-10-CM

## 2022-10-05 DIAGNOSIS — N18.4 HYPERTENSIVE KIDNEY DISEASE WITH STAGE 4 CHRONIC KIDNEY DISEASE: ICD-10-CM

## 2022-10-05 LAB
ALBUMIN SERPL BCP-MCNC: 3.8 G/DL (ref 3.5–5.2)
ALP SERPL-CCNC: 127 U/L (ref 55–135)
ALT SERPL W/O P-5'-P-CCNC: 16 U/L (ref 10–44)
ANION GAP SERPL CALC-SCNC: 14 MMOL/L (ref 8–16)
AST SERPL-CCNC: 21 U/L (ref 10–40)
BASOPHILS # BLD AUTO: 0.06 K/UL (ref 0–0.2)
BASOPHILS NFR BLD: 0.7 % (ref 0–1.9)
BILIRUB SERPL-MCNC: 0.4 MG/DL (ref 0.1–1)
BUN SERPL-MCNC: 28 MG/DL (ref 8–23)
CALCIUM SERPL-MCNC: 9.5 MG/DL (ref 8.7–10.5)
CHLORIDE SERPL-SCNC: 100 MMOL/L (ref 95–110)
CO2 SERPL-SCNC: 21 MMOL/L (ref 23–29)
CREAT SERPL-MCNC: 1.8 MG/DL (ref 0.5–1.4)
DIFFERENTIAL METHOD: ABNORMAL
EOSINOPHIL # BLD AUTO: 0.4 K/UL (ref 0–0.5)
EOSINOPHIL NFR BLD: 5.2 % (ref 0–8)
ERYTHROCYTE [DISTWIDTH] IN BLOOD BY AUTOMATED COUNT: 13.6 % (ref 11.5–14.5)
EST. GFR  (NO RACE VARIABLE): 28.3 ML/MIN/1.73 M^2
GLUCOSE SERPL-MCNC: 90 MG/DL (ref 70–110)
HCT VFR BLD AUTO: 41.1 % (ref 37–48.5)
HGB BLD-MCNC: 13 G/DL (ref 12–16)
IMM GRANULOCYTES # BLD AUTO: 0.03 K/UL (ref 0–0.04)
IMM GRANULOCYTES NFR BLD AUTO: 0.4 % (ref 0–0.5)
LYMPHOCYTES # BLD AUTO: 3.1 K/UL (ref 1–4.8)
LYMPHOCYTES NFR BLD: 39.2 % (ref 18–48)
MCH RBC QN AUTO: 31.3 PG (ref 27–31)
MCHC RBC AUTO-ENTMCNC: 31.6 G/DL (ref 32–36)
MCV RBC AUTO: 99 FL (ref 82–98)
MONOCYTES # BLD AUTO: 0.8 K/UL (ref 0.3–1)
MONOCYTES NFR BLD: 9.7 % (ref 4–15)
NEUTROPHILS # BLD AUTO: 3.6 K/UL (ref 1.8–7.7)
NEUTROPHILS NFR BLD: 44.8 % (ref 38–73)
NRBC BLD-RTO: 0 /100 WBC
PLATELET # BLD AUTO: 256 K/UL (ref 150–450)
PMV BLD AUTO: 11.2 FL (ref 9.2–12.9)
POTASSIUM SERPL-SCNC: 4.8 MMOL/L (ref 3.5–5.1)
PROT SERPL-MCNC: 7.1 G/DL (ref 6–8.4)
RBC # BLD AUTO: 4.15 M/UL (ref 4–5.4)
SODIUM SERPL-SCNC: 135 MMOL/L (ref 136–145)
TSH SERPL DL<=0.005 MIU/L-ACNC: 0.59 UIU/ML (ref 0.4–4)
URATE SERPL-MCNC: 5.9 MG/DL (ref 2.4–5.7)
WBC # BLD AUTO: 8.01 K/UL (ref 3.9–12.7)

## 2022-10-05 PROCEDURE — 84550 ASSAY OF BLOOD/URIC ACID: CPT | Performed by: FAMILY MEDICINE

## 2022-10-05 PROCEDURE — 80053 COMPREHEN METABOLIC PANEL: CPT | Performed by: FAMILY MEDICINE

## 2022-10-05 PROCEDURE — 84443 ASSAY THYROID STIM HORMONE: CPT | Performed by: FAMILY MEDICINE

## 2022-10-05 PROCEDURE — 85025 COMPLETE CBC W/AUTO DIFF WBC: CPT | Performed by: FAMILY MEDICINE

## 2022-10-05 PROCEDURE — 36415 COLL VENOUS BLD VENIPUNCTURE: CPT | Mod: PO | Performed by: FAMILY MEDICINE

## 2022-10-11 ENCOUNTER — PATIENT MESSAGE (OUTPATIENT)
Dept: FAMILY MEDICINE | Facility: CLINIC | Age: 77
End: 2022-10-11
Payer: MEDICARE

## 2022-10-11 DIAGNOSIS — E03.9 HYPOTHYROIDISM, UNSPECIFIED TYPE: ICD-10-CM

## 2022-10-11 DIAGNOSIS — M10.9 GOUT, UNSPECIFIED CAUSE, UNSPECIFIED CHRONICITY, UNSPECIFIED SITE: ICD-10-CM

## 2022-10-11 DIAGNOSIS — N18.4 HYPERTENSIVE KIDNEY DISEASE WITH STAGE 4 CHRONIC KIDNEY DISEASE: ICD-10-CM

## 2022-10-11 DIAGNOSIS — I12.9 HYPERTENSIVE KIDNEY DISEASE WITH STAGE 4 CHRONIC KIDNEY DISEASE: ICD-10-CM

## 2022-10-11 RX ORDER — LEVOTHYROXINE SODIUM 150 UG/1
150 TABLET ORAL DAILY
Qty: 90 TABLET | Refills: 0 | Status: SHIPPED | OUTPATIENT
Start: 2022-10-11 | End: 2022-12-24

## 2022-10-11 RX ORDER — ALLOPURINOL 100 MG/1
100 TABLET ORAL DAILY
Qty: 90 TABLET | Refills: 0 | Status: SHIPPED | OUTPATIENT
Start: 2022-10-11 | End: 2023-02-17

## 2022-10-11 NOTE — TELEPHONE ENCOUNTER
No new care gaps identified.  [facility] Embedded Care Gaps. Reference number: [id number]. 10/11/2022   2:18:55 PM CDT

## 2022-11-16 ENCOUNTER — PES CALL (OUTPATIENT)
Dept: ADMINISTRATIVE | Facility: CLINIC | Age: 77
End: 2022-11-16
Payer: MEDICARE

## 2022-11-30 ENCOUNTER — PATIENT MESSAGE (OUTPATIENT)
Dept: FAMILY MEDICINE | Facility: CLINIC | Age: 77
End: 2022-11-30
Payer: MEDICARE

## 2022-12-01 DIAGNOSIS — I12.9 HYPERTENSIVE KIDNEY DISEASE WITH STAGE 4 CHRONIC KIDNEY DISEASE: ICD-10-CM

## 2022-12-01 DIAGNOSIS — N18.4 HYPERTENSIVE KIDNEY DISEASE WITH STAGE 4 CHRONIC KIDNEY DISEASE: ICD-10-CM

## 2022-12-01 DIAGNOSIS — M25.511 CHRONIC RIGHT SHOULDER PAIN: ICD-10-CM

## 2022-12-01 DIAGNOSIS — G89.29 CHRONIC RIGHT SHOULDER PAIN: ICD-10-CM

## 2022-12-01 DIAGNOSIS — M19.90 OSTEOARTHRITIS, UNSPECIFIED OSTEOARTHRITIS TYPE, UNSPECIFIED SITE: ICD-10-CM

## 2022-12-01 DIAGNOSIS — F41.9 ANXIETY: ICD-10-CM

## 2022-12-01 NOTE — TELEPHONE ENCOUNTER
No new care gaps identified.  [facility] Embedded Care Gaps. Reference number: [id number]. 12/01/2022   8:52:40 AM CST

## 2022-12-02 RX ORDER — NABUMETONE 750 MG/1
750 TABLET, FILM COATED ORAL DAILY
Qty: 90 TABLET | Refills: 3 | Status: SHIPPED | OUTPATIENT
Start: 2022-12-02

## 2022-12-02 RX ORDER — LISINOPRIL 20 MG/1
20 TABLET ORAL DAILY
Qty: 90 TABLET | Refills: 3 | Status: SHIPPED | OUTPATIENT
Start: 2022-12-02 | End: 2023-11-17 | Stop reason: SDUPTHER

## 2022-12-02 RX ORDER — TIZANIDINE 4 MG/1
4 TABLET ORAL DAILY
Qty: 90 TABLET | Refills: 3 | Status: SHIPPED | OUTPATIENT
Start: 2022-12-02

## 2022-12-02 RX ORDER — PAROXETINE HYDROCHLORIDE 20 MG/1
20 TABLET, FILM COATED ORAL DAILY
Qty: 90 TABLET | Refills: 3 | Status: SHIPPED | OUTPATIENT
Start: 2022-12-02 | End: 2023-11-17 | Stop reason: SDUPTHER

## 2022-12-24 DIAGNOSIS — E03.9 HYPOTHYROIDISM, UNSPECIFIED TYPE: ICD-10-CM

## 2022-12-24 RX ORDER — LEVOTHYROXINE SODIUM 150 UG/1
TABLET ORAL
Qty: 90 TABLET | Refills: 0 | Status: SHIPPED | OUTPATIENT
Start: 2022-12-24 | End: 2023-06-05 | Stop reason: SDUPTHER

## 2022-12-24 NOTE — TELEPHONE ENCOUNTER
No new care gaps identified.  [facility] Embedded Care Gaps. Reference number: [id number]. 12/24/2022   2:42:48 AM CST

## 2022-12-24 NOTE — TELEPHONE ENCOUNTER
Refill Decision Note   Roxanne Caballero  is requesting a refill authorization.  Brief Assessment and Rationale for Refill:  Approve     Medication Therapy Plan:    ACCEPTABLE USE PER [facility] PROTOCOL ; RISK MINIMAL WHEN THYROID LABS NORMAL , TSH WNL    Medication Reconciliation Completed: No   Comments:     No Care Gaps recommended.     Note composed:10:28 AM 12/24/2022

## 2023-02-02 ENCOUNTER — LAB VISIT (OUTPATIENT)
Dept: LAB | Facility: HOSPITAL | Age: 78
End: 2023-02-02
Attending: FAMILY MEDICINE
Payer: MEDICARE

## 2023-02-02 DIAGNOSIS — E03.9 HYPOTHYROIDISM, UNSPECIFIED TYPE: ICD-10-CM

## 2023-02-02 LAB — TSH SERPL DL<=0.005 MIU/L-ACNC: 0.77 UIU/ML (ref 0.4–4)

## 2023-02-02 PROCEDURE — 84443 ASSAY THYROID STIM HORMONE: CPT | Mod: HCNC | Performed by: FAMILY MEDICINE

## 2023-02-02 PROCEDURE — 36415 COLL VENOUS BLD VENIPUNCTURE: CPT | Mod: HCNC,PO | Performed by: FAMILY MEDICINE

## 2023-02-07 DIAGNOSIS — Z00.00 ENCOUNTER FOR MEDICARE ANNUAL WELLNESS EXAM: ICD-10-CM

## 2023-02-09 DIAGNOSIS — Z00.00 ENCOUNTER FOR MEDICARE ANNUAL WELLNESS EXAM: ICD-10-CM

## 2023-03-06 DIAGNOSIS — I12.9 HYPERTENSIVE KIDNEY DISEASE WITH STAGE 4 CHRONIC KIDNEY DISEASE: ICD-10-CM

## 2023-03-06 DIAGNOSIS — N18.4 HYPERTENSIVE KIDNEY DISEASE WITH STAGE 4 CHRONIC KIDNEY DISEASE: ICD-10-CM

## 2023-03-06 NOTE — TELEPHONE ENCOUNTER
No new care gaps identified.  [facility] Embedded Care Gaps. Reference number: [id number]. 3/06/2023   4:28:28 PM CST

## 2023-03-07 RX ORDER — TRIAMTERENE AND HYDROCHLOROTHIAZIDE 37.5; 25 MG/1; MG/1
1 CAPSULE ORAL DAILY
Qty: 90 CAPSULE | Refills: 0 | OUTPATIENT
Start: 2023-03-07

## 2023-04-19 ENCOUNTER — TELEPHONE (OUTPATIENT)
Dept: ADMINISTRATIVE | Facility: CLINIC | Age: 78
End: 2023-04-19
Payer: MEDICARE

## 2023-05-08 DIAGNOSIS — I12.9 HYPERTENSIVE KIDNEY DISEASE WITH STAGE 4 CHRONIC KIDNEY DISEASE: ICD-10-CM

## 2023-05-08 DIAGNOSIS — N18.4 HYPERTENSIVE KIDNEY DISEASE WITH STAGE 4 CHRONIC KIDNEY DISEASE: ICD-10-CM

## 2023-05-08 NOTE — TELEPHONE ENCOUNTER
Care Due:                  Date            Visit Type   Department     Provider  --------------------------------------------------------------------------------                                NP -                              PRIMARY      [facility]  Last Visit: 01-      CARE ([facility])   MEDICINE       Vika Marcial  Next Visit: None Scheduled  None         None Found                                                            Last  Test          Frequency    Reason                     Performed    Due Date  --------------------------------------------------------------------------------    Office Visit  12 months..  albuterol, allopurinoL,    01- 01-                             fluticasone,                             triamterene-hydrochloroth                             iazide...................    [employer] Embedded Care Due Messages. Reference number: [id number].   5/08/2023 9:24:30 AM CDT

## 2023-05-08 NOTE — TELEPHONE ENCOUNTER
Refill Routing Note   Medication(s) are not appropriate for processing by [facility] for the following reason(s):      Patient not seen by PCP within 15 months  Required labs abnormal  Required vitals outdated    [facility] action(s):  Defer Appointment due            Appointments  past 12m or future 3m with PCP    Date Provider   Last Visit   1/14/2022 Vika Marcial MD   Next Visit   Visit date not found Vika Marcial MD   ED visits in past 90 days: 0        Note composed:5:21 PM 05/08/2023

## 2023-05-09 RX ORDER — TRIAMTERENE AND HYDROCHLOROTHIAZIDE 37.5; 25 MG/1; MG/1
1 CAPSULE ORAL DAILY
Qty: 90 CAPSULE | Refills: 0 | Status: SHIPPED | OUTPATIENT
Start: 2023-05-09 | End: 2023-06-05 | Stop reason: SDUPTHER

## 2023-05-29 ENCOUNTER — PES CALL (OUTPATIENT)
Dept: ADMINISTRATIVE | Facility: CLINIC | Age: 78
End: 2023-05-29
Payer: MEDICARE

## 2023-05-29 DIAGNOSIS — E03.9 HYPOTHYROIDISM, UNSPECIFIED TYPE: ICD-10-CM

## 2023-05-29 RX ORDER — LEVOTHYROXINE SODIUM 150 UG/1
150 TABLET ORAL DAILY
Qty: 90 TABLET | Refills: 0 | Status: CANCELLED | OUTPATIENT
Start: 2023-05-29

## 2023-06-05 DIAGNOSIS — I12.9 HYPERTENSIVE KIDNEY DISEASE WITH STAGE 4 CHRONIC KIDNEY DISEASE: ICD-10-CM

## 2023-06-05 DIAGNOSIS — N18.4 HYPERTENSIVE KIDNEY DISEASE WITH STAGE 4 CHRONIC KIDNEY DISEASE: ICD-10-CM

## 2023-06-05 NOTE — TELEPHONE ENCOUNTER
No care due was identified.  [facility] Embedded Care Due Messages. Reference number: [id number].   6/05/2023 9:38:44 AM CDT

## 2023-06-06 RX ORDER — TRIAMTERENE AND HYDROCHLOROTHIAZIDE 37.5; 25 MG/1; MG/1
1 CAPSULE ORAL DAILY
Qty: 30 CAPSULE | Refills: 0 | Status: SHIPPED | OUTPATIENT
Start: 2023-06-06 | End: 2023-10-08 | Stop reason: SDUPTHER

## 2023-06-13 DIAGNOSIS — E03.9 HYPOTHYROIDISM, UNSPECIFIED TYPE: ICD-10-CM

## 2023-06-13 RX ORDER — LEVOTHYROXINE SODIUM 150 UG/1
150 TABLET ORAL DAILY
Qty: 90 TABLET | Refills: 3 | OUTPATIENT
Start: 2023-06-13

## 2023-06-13 NOTE — TELEPHONE ENCOUNTER
No care due was identified.  [facility] Embedded Care Due Messages. Reference number: [id number].   6/13/2023 8:41:27 AM CDT

## 2023-06-13 NOTE — TELEPHONE ENCOUNTER
Refill Decision Note   Roxanne Caballero  is requesting a refill authorization.  Brief Assessment and Rationale for Refill:  Quick Discontinue     Medication Therapy Plan:  E-Prescribing Status: Receipt confirmed by pharmacy (6/6/2023  8:30 AM CDT)      Comments:     Note composed:10:01 AM 06/13/2023             Appointments     Last Visit   1/14/2022 Vika Marcial MD   Next Visit   6/27/2023 Vika Marcial MD           Appointments     Last Visit   1/14/2022 Vika Marcial MD   Next Visit   6/27/2023 Vika Marcial MD

## 2023-07-05 DIAGNOSIS — N18.4 HYPERTENSIVE KIDNEY DISEASE WITH STAGE 4 CHRONIC KIDNEY DISEASE: ICD-10-CM

## 2023-07-05 DIAGNOSIS — I12.9 HYPERTENSIVE KIDNEY DISEASE WITH STAGE 4 CHRONIC KIDNEY DISEASE: ICD-10-CM

## 2023-07-05 NOTE — TELEPHONE ENCOUNTER
Care Due:                  Date            Visit Type   Department     Provider  --------------------------------------------------------------------------------                                NP -                              PRIMARY      [facility]  Last Visit: 01-      CARE ([facility])   MEDICINE       Vika Marcial  Next Visit: None Scheduled  None         None Found                                                            Last  Test          Frequency    Reason                     Performed    Due Date  --------------------------------------------------------------------------------    CBC.........  12 months..  allopurinoL..............  10-   09-    CMP.........  12 months..  allopurinoL,               10-   09-                             triamterene-hydrochloroth                             iazide...................    Uric Acid...  12 months..  allopurinoL..............  10-   09-    [facility] Embedded Care Due Messages. Reference number: [id number].   7/05/2023 11:38:31 AM CDT

## 2023-07-08 RX ORDER — TRIAMTERENE AND HYDROCHLOROTHIAZIDE 37.5; 25 MG/1; MG/1
1 CAPSULE ORAL DAILY
Qty: 30 CAPSULE | Refills: 0 | OUTPATIENT
Start: 2023-07-08

## 2023-07-09 ENCOUNTER — NURSE TRIAGE (OUTPATIENT)
Dept: ADMINISTRATIVE | Facility: CLINIC | Age: 78
End: 2023-07-09
Payer: MEDICARE

## 2023-07-09 NOTE — TELEPHONE ENCOUNTER
"Pt states she has "chronic tooth decay," and she has been struggling with a toothache. She has tried saltwater, orajel, and taking ibuprofen. All have been ineffective in managing her pain; rates pain at an 11/10 on the numerical pain scale. Care advice is to see a provider within 4 hours; recommended UCC or ED. Pt refused disposition and states she will call her PCP tomorrow. She is requesting a prescription for clindamycin.     Reason for Disposition   [1] SEVERE pain (e.g., excruciating, unable to eat, unable to do any normal activities) AND [2] not improved 2 hours after pain medicine    Additional Information   Negative: Shock suspected (e.g., cold/pale/clammy skin, too weak to stand, low BP, rapid pulse)   Negative: [1] Similar pain previously AND [2] it was from "heart attack"   Negative: [1] Similar pain previously AND [2] it was from "angina" AND [3] not relieved by nitroglycerin   Negative: Sounds like a life-threatening emergency to the triager   Negative: Tongue is very swollen and tender   Negative: [1] Face is swollen AND [2] fever   Negative: Patient sounds very sick or weak to the triager    Protocols used: Toothache-A-AH    "

## 2023-07-13 DIAGNOSIS — M10.9 GOUT, UNSPECIFIED CAUSE, UNSPECIFIED CHRONICITY, UNSPECIFIED SITE: ICD-10-CM

## 2023-07-13 DIAGNOSIS — I12.9 HYPERTENSIVE KIDNEY DISEASE WITH STAGE 4 CHRONIC KIDNEY DISEASE: ICD-10-CM

## 2023-07-13 DIAGNOSIS — E03.9 HYPOTHYROIDISM, UNSPECIFIED TYPE: Primary | ICD-10-CM

## 2023-07-13 DIAGNOSIS — N18.4 CKD (CHRONIC KIDNEY DISEASE) STAGE 4, GFR 15-29 ML/MIN: ICD-10-CM

## 2023-07-13 DIAGNOSIS — N18.4 HYPERTENSIVE KIDNEY DISEASE WITH STAGE 4 CHRONIC KIDNEY DISEASE: ICD-10-CM

## 2023-07-13 NOTE — TELEPHONE ENCOUNTER
Refill Routing Note   Medication(s) are not appropriate for processing by [facility] for the following reason(s):      Patient not seen by provider within 15 months  Required labs abnormal    [facility] action(s):  Defer Care Due:  None identified            Appointments  past 12m or future 3m with PCP    Date Provider   Last Visit   1/14/2022 Vika Marcial MD   Next Visit   Visit date not found Vika Marcial MD   ED visits in past 90 days: 0        Note composed:11:40 AM 07/13/2023

## 2023-07-13 NOTE — TELEPHONE ENCOUNTER
No care due was identified.  [facility] Embedded Care Due Messages. Reference number: [id number].   7/13/2023 9:06:32 AM CDT

## 2023-07-14 RX ORDER — ALLOPURINOL 100 MG/1
100 TABLET ORAL DAILY
Qty: 90 TABLET | Refills: 0 | Status: SHIPPED | OUTPATIENT
Start: 2023-07-14 | End: 2023-11-17 | Stop reason: SDUPTHER

## 2023-07-18 DIAGNOSIS — M10.9 GOUT, UNSPECIFIED CAUSE, UNSPECIFIED CHRONICITY, UNSPECIFIED SITE: ICD-10-CM

## 2023-07-18 DIAGNOSIS — N18.4 HYPERTENSIVE KIDNEY DISEASE WITH STAGE 4 CHRONIC KIDNEY DISEASE: ICD-10-CM

## 2023-07-18 DIAGNOSIS — I12.9 HYPERTENSIVE KIDNEY DISEASE WITH STAGE 4 CHRONIC KIDNEY DISEASE: ICD-10-CM

## 2023-07-18 NOTE — TELEPHONE ENCOUNTER
No care due was identified.  [facility] Embedded Care Due Messages. Reference number: [id number].   7/18/2023 2:53:13 PM CDT

## 2023-07-19 RX ORDER — ALLOPURINOL 100 MG/1
100 TABLET ORAL DAILY
Qty: 90 TABLET | Refills: 0 | OUTPATIENT
Start: 2023-07-19

## 2023-07-19 NOTE — TELEPHONE ENCOUNTER
Refill Decision Note   Roxanne Federico  is requesting a refill authorization.  Brief Assessment and Rationale for Refill:  Quick Discontinue     Medication Therapy Plan:         Comments:     Note composed:10:20 AM 07/19/2023

## 2023-07-25 DIAGNOSIS — N18.4 HYPERTENSIVE KIDNEY DISEASE WITH STAGE 4 CHRONIC KIDNEY DISEASE: ICD-10-CM

## 2023-07-25 DIAGNOSIS — I12.9 HYPERTENSIVE KIDNEY DISEASE WITH STAGE 4 CHRONIC KIDNEY DISEASE: ICD-10-CM

## 2023-07-25 NOTE — TELEPHONE ENCOUNTER
No care due was identified.  [facility] Embedded Care Due Messages. Reference number: [id number].   7/25/2023 8:23:28 AM CDT

## 2023-07-25 NOTE — TELEPHONE ENCOUNTER
Refill Routing Note     Refill Routing Note   Medication(s) are not appropriate for processing by [facility] for the following reason(s):      Patient seen in ED/Hospital since LOV with provider  Required labs outdated  Required vitals outdated    [facility] action(s):  Defer Care Due:  None identified            Appointments  past 12m or future 3m with PCP    Date Provider   Last Visit   1/14/2022 Vika Marcial MD   Next Visit   Visit date not found Vika Marcial MD   ED visits in past 90 days: 0        Note composed:9:48 AM 07/25/2023

## 2023-07-26 RX ORDER — TRIAMTERENE AND HYDROCHLOROTHIAZIDE 37.5; 25 MG/1; MG/1
1 CAPSULE ORAL DAILY
Qty: 90 CAPSULE | Refills: 0 | OUTPATIENT
Start: 2023-07-26

## 2023-07-26 NOTE — TELEPHONE ENCOUNTER
Medication refill request refused. Patient due for office visit. Routed to staff to facilitate scheduling.

## 2023-07-26 NOTE — TELEPHONE ENCOUNTER
I have reviewed and agree with the assessment below:    Patient seen in ED/Hospital since LOV with provider  Required labs outdated  Required vitals outdated    Thank you.

## 2023-07-27 ENCOUNTER — PATIENT MESSAGE (OUTPATIENT)
Dept: INTERNAL MEDICINE | Facility: CLINIC | Age: 78
End: 2023-07-27
Payer: MEDICARE

## 2023-09-18 DIAGNOSIS — M19.90 OSTEOARTHRITIS, UNSPECIFIED OSTEOARTHRITIS TYPE, UNSPECIFIED SITE: ICD-10-CM

## 2023-09-18 DIAGNOSIS — M25.511 CHRONIC RIGHT SHOULDER PAIN: ICD-10-CM

## 2023-09-18 DIAGNOSIS — N18.4 HYPERTENSIVE KIDNEY DISEASE WITH STAGE 4 CHRONIC KIDNEY DISEASE: ICD-10-CM

## 2023-09-18 DIAGNOSIS — I12.9 HYPERTENSIVE KIDNEY DISEASE WITH STAGE 4 CHRONIC KIDNEY DISEASE: ICD-10-CM

## 2023-09-18 DIAGNOSIS — G89.29 CHRONIC RIGHT SHOULDER PAIN: ICD-10-CM

## 2023-09-18 DIAGNOSIS — F41.9 ANXIETY: ICD-10-CM

## 2023-09-18 NOTE — TELEPHONE ENCOUNTER
Care Due:                  Date            Visit Type   Department     Provider  --------------------------------------------------------------------------------                                NP -                              PRIMARY      [facility]  Last Visit: 01-      CARE ([facility])   MEDICINE       Vika Marcial  Next Visit: None Scheduled  None         None Found                                                            Last  Test          Frequency    Reason                     Performed    Due Date  --------------------------------------------------------------------------------    Office Visit  12 months..  albuterol, allopurinoL,    01- 01-                             fluticasone,                             levothyroxine,                             triamterene-hydrochloroth                             iazide...................    CBC.........  12 months..  allopurinoL..............  10-   09-    CMP.........  12 months..  allopurinoL,               10-   09-                             triamterene-hydrochloroth                             iazide...................    Uric Acid...  12 months..  allopurinoL..............  10-   09-    [facility] Embedded Care Due Messages. Reference number: [id number].   9/18/2023 4:47:37 PM CDT

## 2023-09-19 RX ORDER — TRIAMTERENE AND HYDROCHLOROTHIAZIDE 37.5; 25 MG/1; MG/1
1 CAPSULE ORAL DAILY
Qty: 30 CAPSULE | Refills: 0 | OUTPATIENT
Start: 2023-09-19

## 2023-09-19 RX ORDER — TIZANIDINE 4 MG/1
4 TABLET ORAL DAILY
Qty: 90 TABLET | Refills: 3 | OUTPATIENT
Start: 2023-09-19

## 2023-09-19 RX ORDER — LISINOPRIL 20 MG/1
20 TABLET ORAL DAILY
Qty: 90 TABLET | Refills: 3 | OUTPATIENT
Start: 2023-09-19

## 2023-09-19 RX ORDER — NABUMETONE 750 MG/1
750 TABLET, FILM COATED ORAL DAILY
Qty: 90 TABLET | Refills: 3 | OUTPATIENT
Start: 2023-09-19

## 2023-09-19 RX ORDER — PAROXETINE HYDROCHLORIDE 20 MG/1
20 TABLET, FILM COATED ORAL DAILY
Qty: 90 TABLET | Refills: 3 | OUTPATIENT
Start: 2023-09-19

## 2023-09-19 NOTE — TELEPHONE ENCOUNTER
Refill Routing Note   Medication(s) are not appropriate for processing by [facility] for the following reason(s):      Medication outside of protocol  Patient not seen by provider within 15 months  Required labs outdated  Required vitals outdated    [facility] action(s):  Defer  Route Care Due:  Appointment due  Labs due-CBC/CMP/Uric Acid due 9/2023            Appointments  past 12m or future 3m with PCP    Date Provider   Last Visit   1/14/2022 Vika Marcial MD   Next Visit   Visit date not found Vika Marcial MD   ED visits in past 90 days: 0        Note composed:9:26 AM 09/19/2023

## 2023-09-21 NOTE — TELEPHONE ENCOUNTER
Provider Staff:  Please note Refusal of medication.     Patient needs appointment.      Requested Prescriptions     Refused Prescriptions Disp Refills    triamterene-hydrochlorothiazide 37.5-25 mg (DYAZIDE) 37.5-25 mg per capsule 30 capsule 0     Sig: Take 1 capsule by mouth once daily. Limited refill until follow up     Refused By: YAMILEX ANGELA     Reason for Refusal: Patient needs an appointment    tiZANidine (ZANAFLEX) 4 MG tablet 90 tablet 3     Sig: Take 1 tablet (4 mg total) by mouth once daily.     Refused By: YAMILEX ANGELA     Reason for Refusal: Patient needs an appointment    nabumetone (RELAFEN) 750 MG tablet 90 tablet 3     Sig: Take 1 tablet (750 mg total) by mouth once daily.     Refused By: YAMILEX ANGELA     Reason for Refusal: Patient needs an appointment    paroxetine (PAXIL) 20 MG tablet 90 tablet 3     Sig: Take 1 tablet (20 mg total) by mouth once daily.     Refused By: YAMILEX ANGELA     Reason for Refusal: Patient needs an appointment    lisinopriL (PRINIVIL,ZESTRIL) 20 MG tablet 90 tablet 3     Sig: Take 1 tablet (20 mg total) by mouth once daily.     Refused By: YAMILEX ANGELA     Reason for Refusal: Patient needs an appointment      Thanks!  [facility]   Note composed: 09/21/2023 2:35 PM

## 2023-09-29 DIAGNOSIS — I12.9 HYPERTENSIVE KIDNEY DISEASE WITH STAGE 4 CHRONIC KIDNEY DISEASE: ICD-10-CM

## 2023-09-29 DIAGNOSIS — N18.4 HYPERTENSIVE KIDNEY DISEASE WITH STAGE 4 CHRONIC KIDNEY DISEASE: ICD-10-CM

## 2023-09-29 RX ORDER — TRIAMTERENE AND HYDROCHLOROTHIAZIDE 37.5; 25 MG/1; MG/1
1 CAPSULE ORAL DAILY
Qty: 30 CAPSULE | Refills: 0 | OUTPATIENT
Start: 2023-09-29

## 2023-09-29 NOTE — TELEPHONE ENCOUNTER
No care due was identified.  [facility] Embedded Care Due Messages. Reference number: [id number].   9/29/2023 9:21:50 AM CDT

## 2023-09-29 NOTE — TELEPHONE ENCOUNTER
Refill Decision Note   Roxanne Caballero  is requesting a refill authorization.  Brief Assessment and Rationale for Refill:  Quick Discontinue     Medication Therapy Plan:  Previously denied on 09/19/23 by PCP(patient needs appt) [facility]      Comments:     Note composed:11:06 AM 09/29/2023

## 2023-10-08 DIAGNOSIS — E03.9 HYPOTHYROIDISM, UNSPECIFIED TYPE: ICD-10-CM

## 2023-10-08 DIAGNOSIS — I12.9 HYPERTENSIVE KIDNEY DISEASE WITH STAGE 4 CHRONIC KIDNEY DISEASE: ICD-10-CM

## 2023-10-08 DIAGNOSIS — N18.4 HYPERTENSIVE KIDNEY DISEASE WITH STAGE 4 CHRONIC KIDNEY DISEASE: ICD-10-CM

## 2023-10-08 DIAGNOSIS — M10.9 GOUT, UNSPECIFIED CAUSE, UNSPECIFIED CHRONICITY, UNSPECIFIED SITE: ICD-10-CM

## 2023-10-08 RX ORDER — ALLOPURINOL 100 MG/1
100 TABLET ORAL DAILY
Qty: 90 TABLET | Refills: 0 | Status: CANCELLED | OUTPATIENT
Start: 2023-10-08

## 2023-10-09 NOTE — TELEPHONE ENCOUNTER
No care due was identified.  [facility] Embedded Care Due Messages. Reference number: [id number].   10/08/2023 8:11:33 PM CDT

## 2023-10-10 DIAGNOSIS — F41.9 ANXIETY: ICD-10-CM

## 2023-10-10 DIAGNOSIS — N18.4 HYPERTENSIVE KIDNEY DISEASE WITH STAGE 4 CHRONIC KIDNEY DISEASE: ICD-10-CM

## 2023-10-10 DIAGNOSIS — I12.9 HYPERTENSIVE KIDNEY DISEASE WITH STAGE 4 CHRONIC KIDNEY DISEASE: ICD-10-CM

## 2023-10-10 RX ORDER — NABUMETONE 750 MG/1
750 TABLET, FILM COATED ORAL DAILY
Qty: 90 TABLET | Refills: 3 | OUTPATIENT
Start: 2023-10-10

## 2023-10-10 RX ORDER — TRIAMTERENE AND HYDROCHLOROTHIAZIDE 37.5; 25 MG/1; MG/1
1 CAPSULE ORAL DAILY
Qty: 30 CAPSULE | Refills: 0 | Status: SHIPPED | OUTPATIENT
Start: 2023-10-10 | End: 2023-11-04 | Stop reason: SDUPTHER

## 2023-10-10 RX ORDER — TRIAMTERENE AND HYDROCHLOROTHIAZIDE 37.5; 25 MG/1; MG/1
1 CAPSULE ORAL DAILY
Qty: 30 CAPSULE | Refills: 0 | OUTPATIENT
Start: 2023-10-10

## 2023-10-10 RX ORDER — PAROXETINE HYDROCHLORIDE 20 MG/1
20 TABLET, FILM COATED ORAL DAILY
Qty: 90 TABLET | Refills: 3 | OUTPATIENT
Start: 2023-10-10

## 2023-10-10 RX ORDER — LEVOTHYROXINE SODIUM 150 UG/1
150 TABLET ORAL DAILY
Qty: 90 TABLET | Refills: 3 | Status: SHIPPED | OUTPATIENT
Start: 2023-10-10

## 2023-10-10 RX ORDER — LISINOPRIL 20 MG/1
20 TABLET ORAL DAILY
Qty: 90 TABLET | Refills: 3 | OUTPATIENT
Start: 2023-10-10

## 2023-10-10 NOTE — TELEPHONE ENCOUNTER
No care due was identified.  Health [facility] Embedded Care Due Messages. Reference number: [id number].   10/10/2023 9:07:51 AM CDT

## 2023-10-10 NOTE — TELEPHONE ENCOUNTER
Refill Routing Note   Medication(s) are not appropriate for processing by [facility] for the following reason(s):      Medication outside of protocol  Patient not seen by provider within 15 months  Required labs outdated  Required vitals outdated    [facility] action(s):  Defer  Quick Discontinue  Route Care Due:  None identified     Medication Therapy Plan: Dyazide was sent today      Appointments  past 12m or future 3m with PCP    Date Provider   Last Visit   1/14/2022 Vika Marcial MD   Next Visit   Visit date not found Vika Marcial MD   ED visits in past 90 days: 0        Note composed:10:55 AM 10/10/2023

## 2023-10-10 NOTE — TELEPHONE ENCOUNTER
Provider Staff:  Please note Refusal of medication.     Action required for this patient.      Requested Prescriptions     Refused Prescriptions Disp Refills    paroxetine (PAXIL) 20 MG tablet 90 tablet 3     Sig: Take 1 tablet (20 mg total) by mouth once daily.     Refused By: YAMILEX ANGELA     Reason for Refusal: Patient needs an appointment    nabumetone (RELAFEN) 750 MG tablet 90 tablet 3     Sig: Take 1 tablet (750 mg total) by mouth once daily.     Refused By: YAMILEX ANGELA     Reason for Refusal: Patient needs an appointment    lisinopriL (PRINIVIL,ZESTRIL) 20 MG tablet 90 tablet 3     Sig: Take 1 tablet (20 mg total) by mouth once daily.     Refused By: YAMILEX ANGELA     Reason for Refusal: Patient needs an appointment    triamterene-hydrochlorothiazide 37.5-25 mg (DYAZIDE) 37.5-25 mg per capsule 30 capsule 0     Sig: Take 1 capsule by mouth once daily. Limited refill until follow up     Refused By: KATHLEEN ROSS     Reason for Refusal: Request already responded to by other means (e.g. phone or fax)      Thanks!  [facility]   Note composed: 10/10/2023 6:10 PM

## 2023-11-04 DIAGNOSIS — N18.4 HYPERTENSIVE KIDNEY DISEASE WITH STAGE 4 CHRONIC KIDNEY DISEASE: ICD-10-CM

## 2023-11-04 DIAGNOSIS — I12.9 HYPERTENSIVE KIDNEY DISEASE WITH STAGE 4 CHRONIC KIDNEY DISEASE: ICD-10-CM

## 2023-11-04 NOTE — TELEPHONE ENCOUNTER
Care Due:                  Date            Visit Type   Department     Provider  --------------------------------------------------------------------------------                                NP -                              PRIMARY      [facility]  Last Visit: 01-      CARE ([facility])   MEDICINE       Vika T  Marcial                              EP -                              [facility]  Next Visit: 07-      CARE ([facility])   MEDICINE       Vika T  Marcial                                                            Last  Test          Frequency    Reason                     Performed    Due Date  --------------------------------------------------------------------------------    TSH.........  12 months..  levothyroxine............  02- 01-    Health [facility] Embedded Care Due Messages. Reference number: [id number].   11/04/2023 9:41:58 AM CDT

## 2023-11-05 NOTE — TELEPHONE ENCOUNTER
Refill Routing Note   Medication(s) are not appropriate for processing by [facility] for the following reason(s):      Patient not seen by PCP within 15 months  Required labs outdated  Required vitals outdated    [facility] action(s):  Defer Care Due:  Labs due          Appointments  past 12m or future 3m with PCP    Date Provider   Last Visit   1/14/2022 Vika Marcial MD   Next Visit   Visit date not found Vika Marcial MD   ED visits in past 90 days: 0        Note composed:9:19 PM 11/04/2023

## 2023-11-06 RX ORDER — TRIAMTERENE AND HYDROCHLOROTHIAZIDE 37.5; 25 MG/1; MG/1
1 CAPSULE ORAL DAILY
Qty: 30 CAPSULE | Refills: 0 | Status: SHIPPED | OUTPATIENT
Start: 2023-11-06 | End: 2023-11-17 | Stop reason: SDUPTHER

## 2023-11-10 ENCOUNTER — TELEPHONE (OUTPATIENT)
Dept: ADMINISTRATIVE | Facility: CLINIC | Age: 78
End: 2023-11-10
Payer: MEDICARE

## 2023-11-10 ENCOUNTER — OFFICE VISIT (OUTPATIENT)
Dept: FAMILY MEDICINE | Facility: CLINIC | Age: 78
End: 2023-11-10
Payer: MEDICARE

## 2023-11-10 VITALS — WEIGHT: 281.5 LBS | BODY MASS INDEX: 45.24 KG/M2 | HEIGHT: 66 IN

## 2023-11-10 DIAGNOSIS — E66.01 CLASS 3 SEVERE OBESITY DUE TO EXCESS CALORIES WITH SERIOUS COMORBIDITY AND BODY MASS INDEX (BMI) OF 45.0 TO 49.9 IN ADULT: ICD-10-CM

## 2023-11-10 DIAGNOSIS — N18.4 CKD (CHRONIC KIDNEY DISEASE) STAGE 4, GFR 15-29 ML/MIN: ICD-10-CM

## 2023-11-10 DIAGNOSIS — Z00.00 ENCOUNTER FOR MEDICARE ANNUAL WELLNESS EXAM: ICD-10-CM

## 2023-11-10 DIAGNOSIS — J44.9 CHRONIC OBSTRUCTIVE PULMONARY DISEASE, UNSPECIFIED COPD TYPE: ICD-10-CM

## 2023-11-10 DIAGNOSIS — Z00.00 ENCOUNTER FOR PREVENTIVE HEALTH EXAMINATION: Primary | ICD-10-CM

## 2023-11-10 DIAGNOSIS — R26.9 ABNORMALITY OF GAIT AND MOBILITY: ICD-10-CM

## 2023-11-10 DIAGNOSIS — Z99.89 DEPENDENCE ON OTHER ENABLING MACHINES AND DEVICES: ICD-10-CM

## 2023-11-10 PROCEDURE — 3288F PR FALLS RISK ASSESSMENT DOCUMENTED: ICD-10-PCS | Mod: HCNC,CPTII,95, | Performed by: NURSE PRACTITIONER

## 2023-11-10 PROCEDURE — 1159F PR MEDICATION LIST DOCUMENTED IN MEDICAL RECORD: ICD-10-PCS | Mod: HCNC,CPTII,95, | Performed by: NURSE PRACTITIONER

## 2023-11-10 PROCEDURE — G0439 PPPS, SUBSEQ VISIT: HCPCS | Mod: HCNC,95,, | Performed by: NURSE PRACTITIONER

## 2023-11-10 PROCEDURE — 1125F PR PAIN SEVERITY QUANTIFIED, PAIN PRESENT: ICD-10-PCS | Mod: HCNC,CPTII,95, | Performed by: NURSE PRACTITIONER

## 2023-11-10 PROCEDURE — 1159F MED LIST DOCD IN RCRD: CPT | Mod: HCNC,CPTII,95, | Performed by: NURSE PRACTITIONER

## 2023-11-10 PROCEDURE — 1160F PR REVIEW ALL MEDS BY PRESCRIBER/CLIN PHARMACIST DOCUMENTED: ICD-10-PCS | Mod: HCNC,CPTII,95, | Performed by: NURSE PRACTITIONER

## 2023-11-10 PROCEDURE — G0439 PR MEDICARE ANNUAL WELLNESS SUBSEQUENT VISIT: ICD-10-PCS | Mod: HCNC,95,, | Performed by: NURSE PRACTITIONER

## 2023-11-10 PROCEDURE — 1101F PT FALLS ASSESS-DOCD LE1/YR: CPT | Mod: HCNC,CPTII,95, | Performed by: NURSE PRACTITIONER

## 2023-11-10 PROCEDURE — 3288F FALL RISK ASSESSMENT DOCD: CPT | Mod: HCNC,CPTII,95, | Performed by: NURSE PRACTITIONER

## 2023-11-10 PROCEDURE — 1160F RVW MEDS BY RX/DR IN RCRD: CPT | Mod: HCNC,CPTII,95, | Performed by: NURSE PRACTITIONER

## 2023-11-10 PROCEDURE — 1101F PR PT FALLS ASSESS DOC 0-1 FALLS W/OUT INJ PAST YR: ICD-10-PCS | Mod: HCNC,CPTII,95, | Performed by: NURSE PRACTITIONER

## 2023-11-10 PROCEDURE — 1125F AMNT PAIN NOTED PAIN PRSNT: CPT | Mod: HCNC,CPTII,95, | Performed by: NURSE PRACTITIONER

## 2023-11-10 NOTE — PROGRESS NOTES
"The patient location is: [place]  The chief complaint leading to consultation is: Medicare AWV  Visit type: audiovisual    Face to Face time with patient: 44 minutes  60 minutes of total time spent on the encounter, which includes face to face time and non-face to face time preparing to see the patient (eg, review of tests), Obtaining and/or reviewing separately obtained history, Documenting clinical information in the electronic or other health record, Independently interpreting results (not separately reported) and communicating results to the patient/family/caregiver, or Care coordination (not separately reported).     Each patient to whom he or she provides medical services by telemedicine is:  (1) informed of the relationship between the physician and patient and the respective role of any other health care provider with respect to management of the patient; and (2) notified that he or she may decline to receive medical services by telemedicine and may withdraw from such care at any time.    Roxanne Caballero presented for a  Medicare AWV and comprehensive Health Risk Assessment today. The following components were reviewed and updated:    Medical history  Family History  Social history  Allergies and Current Medications  Health Risk Assessment  Health Maintenance  Care Team         ** See Completed Assessments for Annual Wellness Visit within the encounter summary.**         The following assessments were completed:  Living Situation  CAGE  Depression Screening  Fall Risk Assessment (MACH 10)  Hearing Assessment(HHI)  Cognitive Function Screening  Nutrition Screening  ADL Screening  PAQ Screening      Vitals:    11/10/23 1636   Weight: 127.7 kg (281 lb 8.4 oz)   Height: 5' 6" (1.676 m)     Body mass index is 45.44 kg/m².    Physical Exam  Constitutional:       General: She is not in acute distress.     Appearance: Normal appearance. She is well-developed and well-groomed.   HENT:      Head: Normocephalic. "   Pulmonary:      Effort: Pulmonary effort is normal. No respiratory distress.   Neurological:      Mental Status: She is alert and oriented to person, place, and time.   Psychiatric:         Attention and Perception: Attention normal.         Mood and Affect: Mood and affect normal.         Speech: Speech normal.         Behavior: Behavior normal. Behavior is cooperative.     Physical exam limited d/t virtual visit. Patient does not appear to be in any respiratory distress. Able to speak in complete sentences without becoming short of breath.        Diagnoses and health risks identified today and associated recommendations/orders:    1. Encounter for preventive health examination  Pain level assessed and reviewed. Not taking any opioids; at low risk for opioid use disorder. Follow up with PCP.    2. Chronic obstructive pulmonary disease, unspecified COPD type  Chronic; stable. Follow up with PCP.    3. CKD (chronic kidney disease) stage 4, GFR 15-29 ml/min  Chronic; stable on medication. Follow up with PCP.    4. Dependence on other enabling machines and devices  Uses wheeled chair or walker; no recent falls. Follow up with PCP.    5. Abnormality of gait and mobility  Uses wheeled chair or walker; no recent falls. Follow up with PCP.    6. Class 3 severe obesity due to excess calories with serious comorbidity and body mass index (BMI) of 45.0 to 49.9 in adult  Eat a low salt/low fat diet and discussed importance of engaging in physical activity at least 5x/week for a minimum of 30 min/day.    7. Encounter for Medicare annual wellness exam  - Ambulatory Referral/Consult to Enhanced Annual Wellness Visit (eAWV)      Provided Roxanne with a 5-10 year written screening schedule and personal prevention plan. Recommendations were developed using the USPSTF age appropriate recommendations. Education, counseling, and referrals were provided as needed. After Visit Summary given to patient which includes a list of additional  screenings/tests needed.    Follow up for your next annual wellness visit.    Felisa Marmolejo NP      Advance Care Planning     I offered to discuss advanced care planning, including how to pick a person who would make decisions for you if you were unable to make them for yourself, called a health care power of , and what kind of decisions you might make such as use of life sustaining treatments such as ventilators and tube feeding when faced with a life limiting illness recorded on a living will that they will need to know. (How you want to be cared for as you near the end of your natural life)     X  Patient has advanced directives written and agrees to provide copies to the institution.

## 2023-11-10 NOTE — PATIENT INSTRUCTIONS
Counseling and Referral of Other Preventative  (Italic type indicates deductible and co-insurance are waived)    Patient Name: Roxanne Caballero  Today's Date: 11/10/2023    Health Maintenance       Date Due Completion Date    TETANUS VACCINE Never done ---    Shingles Vaccine (1 of 2) Never done ---    RSV Vaccine (Age 60+) (1 - 1-dose 60+ series) Never done ---    Influenza Vaccine (1) 09/01/2023 1/14/2022    COVID-19 Vaccine (3 - 2023-24 season) 09/01/2023 12/22/2021    DEXA Scan 11/13/2023 11/13/2020 (Done)    Override on 11/13/2020: Done    Lipid Panel 05/07/2026 5/7/2021        No orders of the defined types were placed in this encounter.    The following information is provided to all patients.  This information is to help you find resources for any of the problems found today that may be affecting your health:                Living healthy guide: www.[facility].[place].[facility]      Understanding Diabetes: www.diabetes.org      Eating healthy: www.cdc.gov/healthyweight      CDC home safety checklist: www.cdc.gov/steadi/patient.html      Agency on Aging: www.goea.[place].gov      Alcoholics anonymous (AA): www.aa.org      Physical Activity: www.isabel.nih.gov/ox0unjq      Tobacco use: www.quitwithusla.org

## 2023-11-14 ENCOUNTER — PATIENT MESSAGE (OUTPATIENT)
Dept: ADMINISTRATIVE | Facility: HOSPITAL | Age: 78
End: 2023-11-14
Payer: MEDICARE

## 2023-11-17 ENCOUNTER — LAB VISIT (OUTPATIENT)
Dept: LAB | Facility: HOSPITAL | Age: 78
End: 2023-11-17
Attending: FAMILY MEDICINE
Payer: MEDICARE

## 2023-11-17 ENCOUNTER — OFFICE VISIT (OUTPATIENT)
Dept: INTERNAL MEDICINE | Facility: CLINIC | Age: 78
End: 2023-11-17
Payer: MEDICARE

## 2023-11-17 VITALS
SYSTOLIC BLOOD PRESSURE: 102 MMHG | DIASTOLIC BLOOD PRESSURE: 60 MMHG | WEIGHT: 281.5 LBS | HEART RATE: 74 BPM | HEIGHT: 66 IN | BODY MASS INDEX: 45.24 KG/M2 | OXYGEN SATURATION: 84 %

## 2023-11-17 DIAGNOSIS — N18.4 HYPERTENSIVE KIDNEY DISEASE WITH STAGE 4 CHRONIC KIDNEY DISEASE: ICD-10-CM

## 2023-11-17 DIAGNOSIS — E03.9 HYPOTHYROIDISM, UNSPECIFIED TYPE: ICD-10-CM

## 2023-11-17 DIAGNOSIS — N18.4 CKD (CHRONIC KIDNEY DISEASE) STAGE 4, GFR 15-29 ML/MIN: ICD-10-CM

## 2023-11-17 DIAGNOSIS — M10.9 GOUT, UNSPECIFIED CAUSE, UNSPECIFIED CHRONICITY, UNSPECIFIED SITE: ICD-10-CM

## 2023-11-17 DIAGNOSIS — I12.9 HYPERTENSIVE KIDNEY DISEASE WITH STAGE 4 CHRONIC KIDNEY DISEASE: ICD-10-CM

## 2023-11-17 DIAGNOSIS — F41.9 ANXIETY: ICD-10-CM

## 2023-11-17 LAB
ALBUMIN SERPL BCP-MCNC: 3.6 G/DL (ref 3.5–5.2)
ALBUMIN SERPL BCP-MCNC: 3.6 G/DL (ref 3.5–5.2)
ALP SERPL-CCNC: 119 U/L (ref 55–135)
ALT SERPL W/O P-5'-P-CCNC: 19 U/L (ref 10–44)
ANION GAP SERPL CALC-SCNC: 11 MMOL/L (ref 8–16)
AST SERPL-CCNC: 26 U/L (ref 10–40)
BASOPHILS # BLD AUTO: 0.07 K/UL (ref 0–0.2)
BASOPHILS NFR BLD: 0.9 % (ref 0–1.9)
BILIRUB DIRECT SERPL-MCNC: 0.2 MG/DL (ref 0.1–0.3)
BILIRUB SERPL-MCNC: 0.3 MG/DL (ref 0.1–1)
BUN SERPL-MCNC: 54 MG/DL (ref 8–23)
CALCIUM SERPL-MCNC: 9.5 MG/DL (ref 8.7–10.5)
CHLORIDE SERPL-SCNC: 102 MMOL/L (ref 95–110)
CHOLEST SERPL-MCNC: 149 MG/DL (ref 120–199)
CHOLEST/HDLC SERPL: 3.3 {RATIO} (ref 2–5)
CO2 SERPL-SCNC: 25 MMOL/L (ref 23–29)
CREAT SERPL-MCNC: 2.4 MG/DL (ref 0.5–1.4)
DIFFERENTIAL METHOD: ABNORMAL
EOSINOPHIL # BLD AUTO: 0.6 K/UL (ref 0–0.5)
EOSINOPHIL NFR BLD: 7.5 % (ref 0–8)
ERYTHROCYTE [DISTWIDTH] IN BLOOD BY AUTOMATED COUNT: 13.7 % (ref 11.5–14.5)
EST. GFR  (NO RACE VARIABLE): 19.9 ML/MIN/1.73 M^2
GLUCOSE SERPL-MCNC: 105 MG/DL (ref 70–110)
HCT VFR BLD AUTO: 42.8 % (ref 37–48.5)
HDLC SERPL-MCNC: 45 MG/DL (ref 40–75)
HDLC SERPL: 30.2 % (ref 20–50)
HGB BLD-MCNC: 13.9 G/DL (ref 12–16)
IMM GRANULOCYTES # BLD AUTO: 0.02 K/UL (ref 0–0.04)
IMM GRANULOCYTES NFR BLD AUTO: 0.3 % (ref 0–0.5)
LDLC SERPL CALC-MCNC: 77.4 MG/DL (ref 63–159)
LYMPHOCYTES # BLD AUTO: 1.8 K/UL (ref 1–4.8)
LYMPHOCYTES NFR BLD: 23.5 % (ref 18–48)
MCH RBC QN AUTO: 31.6 PG (ref 27–31)
MCHC RBC AUTO-ENTMCNC: 32.5 G/DL (ref 32–36)
MCV RBC AUTO: 97 FL (ref 82–98)
MONOCYTES # BLD AUTO: 0.7 K/UL (ref 0.3–1)
MONOCYTES NFR BLD: 8.9 % (ref 4–15)
NEUTROPHILS # BLD AUTO: 4.6 K/UL (ref 1.8–7.7)
NEUTROPHILS NFR BLD: 58.9 % (ref 38–73)
NONHDLC SERPL-MCNC: 104 MG/DL
NRBC BLD-RTO: 0 /100 WBC
PHOSPHATE SERPL-MCNC: 3.6 MG/DL (ref 2.7–4.5)
PLATELET # BLD AUTO: 250 K/UL (ref 150–450)
PMV BLD AUTO: 11.8 FL (ref 9.2–12.9)
POTASSIUM SERPL-SCNC: 5.1 MMOL/L (ref 3.5–5.1)
PROT SERPL-MCNC: 7.5 G/DL (ref 6–8.4)
RBC # BLD AUTO: 4.4 M/UL (ref 4–5.4)
SODIUM SERPL-SCNC: 138 MMOL/L (ref 136–145)
T4 FREE SERPL-MCNC: 1.64 NG/DL (ref 0.71–1.51)
TRIGL SERPL-MCNC: 133 MG/DL (ref 30–150)
TSH SERPL DL<=0.005 MIU/L-ACNC: 0.09 UIU/ML (ref 0.4–4)
URATE SERPL-MCNC: 8.2 MG/DL (ref 2.4–5.7)
WBC # BLD AUTO: 7.73 K/UL (ref 3.9–12.7)

## 2023-11-17 PROCEDURE — 3288F PR FALLS RISK ASSESSMENT DOCUMENTED: ICD-10-PCS | Mod: HCNC,CPTII,GC,S$GLB

## 2023-11-17 PROCEDURE — 1125F PR PAIN SEVERITY QUANTIFIED, PAIN PRESENT: ICD-10-PCS | Mod: HCNC,CPTII,GC,S$GLB

## 2023-11-17 PROCEDURE — 99213 OFFICE O/P EST LOW 20 MIN: CPT | Mod: HCNC,GC,S$GLB,

## 2023-11-17 PROCEDURE — 3078F DIAST BP <80 MM HG: CPT | Mod: HCNC,CPTII,GC,S$GLB

## 2023-11-17 PROCEDURE — 84443 ASSAY THYROID STIM HORMONE: CPT | Mod: HCNC | Performed by: FAMILY MEDICINE

## 2023-11-17 PROCEDURE — 99213 PR OFFICE/OUTPT VISIT, EST, LEVL III, 20-29 MIN: ICD-10-PCS | Mod: HCNC,GC,S$GLB,

## 2023-11-17 PROCEDURE — 1125F AMNT PAIN NOTED PAIN PRSNT: CPT | Mod: HCNC,CPTII,GC,S$GLB

## 2023-11-17 PROCEDURE — 80069 RENAL FUNCTION PANEL: CPT | Mod: HCNC | Performed by: FAMILY MEDICINE

## 2023-11-17 PROCEDURE — 84439 ASSAY OF FREE THYROXINE: CPT | Mod: HCNC | Performed by: FAMILY MEDICINE

## 2023-11-17 PROCEDURE — 3074F SYST BP LT 130 MM HG: CPT | Mod: HCNC,CPTII,GC,S$GLB

## 2023-11-17 PROCEDURE — 3074F PR MOST RECENT SYSTOLIC BLOOD PRESSURE < 130 MM HG: ICD-10-PCS | Mod: HCNC,CPTII,GC,S$GLB

## 2023-11-17 PROCEDURE — 84550 ASSAY OF BLOOD/URIC ACID: CPT | Mod: HCNC | Performed by: FAMILY MEDICINE

## 2023-11-17 PROCEDURE — 3078F PR MOST RECENT DIASTOLIC BLOOD PRESSURE < 80 MM HG: ICD-10-PCS | Mod: HCNC,CPTII,GC,S$GLB

## 2023-11-17 PROCEDURE — 85025 COMPLETE CBC W/AUTO DIFF WBC: CPT | Mod: HCNC | Performed by: FAMILY MEDICINE

## 2023-11-17 PROCEDURE — 99999 PR PBB SHADOW E&M-EST. PATIENT-LVL III: ICD-10-PCS | Mod: PBBFAC,HCNC,GC,

## 2023-11-17 PROCEDURE — 1101F PR PT FALLS ASSESS DOC 0-1 FALLS W/OUT INJ PAST YR: ICD-10-PCS | Mod: HCNC,CPTII,GC,S$GLB

## 2023-11-17 PROCEDURE — 80061 LIPID PANEL: CPT | Mod: HCNC | Performed by: FAMILY MEDICINE

## 2023-11-17 PROCEDURE — 1101F PT FALLS ASSESS-DOCD LE1/YR: CPT | Mod: HCNC,CPTII,GC,S$GLB

## 2023-11-17 PROCEDURE — 36415 COLL VENOUS BLD VENIPUNCTURE: CPT | Mod: HCNC | Performed by: FAMILY MEDICINE

## 2023-11-17 PROCEDURE — 99999 PR PBB SHADOW E&M-EST. PATIENT-LVL III: CPT | Mod: PBBFAC,HCNC,GC,

## 2023-11-17 PROCEDURE — 3288F FALL RISK ASSESSMENT DOCD: CPT | Mod: HCNC,CPTII,GC,S$GLB

## 2023-11-17 PROCEDURE — 84075 ASSAY ALKALINE PHOSPHATASE: CPT | Mod: HCNC | Performed by: FAMILY MEDICINE

## 2023-11-17 RX ORDER — LISINOPRIL 20 MG/1
20 TABLET ORAL DAILY
Qty: 90 TABLET | Refills: 3 | Status: SHIPPED | OUTPATIENT
Start: 2023-11-17

## 2023-11-17 RX ORDER — PAROXETINE HYDROCHLORIDE 20 MG/1
20 TABLET, FILM COATED ORAL 2 TIMES DAILY
Qty: 90 TABLET | Refills: 3 | Status: SHIPPED | OUTPATIENT
Start: 2023-11-17 | End: 2023-11-17 | Stop reason: SDUPTHER

## 2023-11-17 RX ORDER — PAROXETINE HYDROCHLORIDE 20 MG/1
20 TABLET, FILM COATED ORAL 2 TIMES DAILY
Qty: 90 TABLET | Refills: 3 | Status: SHIPPED | OUTPATIENT
Start: 2023-11-17 | End: 2023-11-21 | Stop reason: SDUPTHER

## 2023-11-17 RX ORDER — PAROXETINE HYDROCHLORIDE 20 MG/1
20 TABLET, FILM COATED ORAL EVERY MORNING
Qty: 30 TABLET | Refills: 11 | Status: CANCELLED | OUTPATIENT
Start: 2023-11-17 | End: 2024-11-16

## 2023-11-17 RX ORDER — ALLOPURINOL 100 MG/1
100 TABLET ORAL DAILY
Qty: 90 TABLET | Refills: 0 | Status: SHIPPED | OUTPATIENT
Start: 2023-11-17 | End: 2023-12-01 | Stop reason: SDUPTHER

## 2023-11-17 RX ORDER — PAROXETINE HYDROCHLORIDE 20 MG/1
20 TABLET, FILM COATED ORAL 2 TIMES DAILY
Qty: 20 TABLET | Refills: 0 | Status: SHIPPED | OUTPATIENT
Start: 2023-11-17 | End: 2023-11-27

## 2023-11-17 RX ORDER — PAROXETINE HYDROCHLORIDE 20 MG/1
20 TABLET, FILM COATED ORAL 2 TIMES DAILY
Qty: 20 TABLET | Refills: 0 | Status: SHIPPED | OUTPATIENT
Start: 2023-11-17 | End: 2023-11-17

## 2023-11-17 RX ORDER — PAROXETINE HYDROCHLORIDE 20 MG/1
20 TABLET, FILM COATED ORAL 2 TIMES DAILY
Qty: 20 TABLET | Refills: 0 | Status: SHIPPED | OUTPATIENT
Start: 2023-11-17 | End: 2023-11-17 | Stop reason: SDUPTHER

## 2023-11-17 RX ORDER — TRIAMTERENE AND HYDROCHLOROTHIAZIDE 37.5; 25 MG/1; MG/1
1 CAPSULE ORAL DAILY
Qty: 30 CAPSULE | Refills: 0 | Status: SHIPPED | OUTPATIENT
Start: 2023-11-17 | End: 2023-11-21 | Stop reason: SDUPTHER

## 2023-11-18 NOTE — PROGRESS NOTES
[facility]   Clinic Note    Patient Name: Roxanne Caballero   YOB: 1945     SUBJECTIVE:     Chief Complaint:    History of Present Illness:    Ms. Roxanne Caballero is a 78 y.o. female with history of HTN, hypothyroidism, SABRINA, morbid obesity, and gout who presents to the clinic to establish care. The patient wanted refill on her medication. She has been taking paroxetine 20 mg once daily for the past 2 years but before that she had been taking it twice daily for almost a year. Patient says that two tablets a day was helping her a lot with her anxiety but ever since she started taking one tablet she has been getting more anxious about paying bills and things in general. She has been told about the side effects of increased Paxil and is counseled to watch for signs of depression, worsening anxiety, mood changes and suicidal thoughts. Patient understands it. She got flu shot today in the office. She denies any chest pain, SOB, abdominal pain, N/V/D, fatigue.     Review of Systems   Constitutional:  Negative for fever and weight loss.   HENT:  Negative for ear discharge and sore throat.    Eyes:  Negative for double vision and pain.   Respiratory:  Negative for cough, hemoptysis and shortness of breath.    Cardiovascular:  Negative for chest pain and leg swelling.   Gastrointestinal:  Negative for abdominal pain, constipation, nausea and vomiting.   Genitourinary:  Negative for frequency and urgency.   Musculoskeletal:  Negative for back pain and neck pain.   Neurological:  Negative for focal weakness and headaches.   Psychiatric/Behavioral:  Negative for depression and suicidal ideas. The patient is nervous/anxious.        PAST HISTORY:     Past Medical History:   Diagnosis Date    Anxiety     Arthritis     Bilateral primary osteoarthritis of knee 10/23/2008    Right worse than left    Cervical stenosis of spine 01/02/2009    Emphysema of lung     Hyperlipidemia     Hypertension      Hypothyroidism     Osteoarthritis of left hip 10/23/2008    Osteoarthritis of lumbar spine 10/23/2008       Past Surgical History:   Procedure Laterality Date    APPENDECTOMY      CHOLECYSTECTOMY      TONSILLECTOMY         Family History   Problem Relation Age of Onset    Hypertension Mother     Cervical cancer Mother     Emphysema Father     Cancer Paternal Grandfather         Intestinal       Social History     Socioeconomic History    Marital status:    Tobacco Use    Smoking status: Former     Current packs/day: 0.00     Types: Cigarettes     Quit date: 1997     Years since quittin.3    Smokeless tobacco: Never   Substance and Sexual Activity    Alcohol use: No     Social Determinants of Health     Financial Resource Strain: High Risk (2023)    Overall Financial Resource Strain (CARDIA)     Difficulty of Paying Living Expenses: Very hard   Food Insecurity: Food Insecurity Present (2023)    Hunger Vital Sign     Worried About Running Out of Food in the Last Year: Often true     Ran Out of Food in the Last Year: Sometimes true   Transportation Needs: Unmet Transportation Needs (2023)    PRAPARE - Transportation     Lack of Transportation (Medical): Yes     Lack of Transportation (Non-Medical): No   Physical Activity: Inactive (2023)    Exercise Vital Sign     Days of Exercise per Week: 0 days     Minutes of Exercise per Session: 0 min   Stress: Stress Concern Present (2023)    [place] [employer] - Occupational Stress Questionnaire     Feeling of Stress : Very much   Social Connections: Moderately Isolated (2023)    Social Connection and Isolation Panel [NHANES]     Frequency of Communication with Friends and Family: Once a week     Frequency of Social Gatherings with Friends and Family: More than three times a week     Attends Baptism Services: More than 4 times per year     Active Member of Clubs or Organizations: No     Attends Club or  "Organization Meetings: Never     Marital Status:    Housing Stability: Low Risk  (11/14/2023)    Housing Stability Vital Sign     Unable to Pay for Housing in the Last Year: No     Number of Places Lived in the Last Year: 1     Unstable Housing in the Last Year: No       MEDICATIONS & ALLERGIES:     Current Outpatient Medications on File Prior to Visit   Medication Sig    levothyroxine (SYNTHROID) 150 MCG tablet Take 1 tablet (150 mcg total) by mouth once daily.    nabumetone (RELAFEN) 750 MG tablet Take 1 tablet (750 mg total) by mouth once daily.    omeprazole (PRILOSEC) 20 MG capsule Take 20 mg by mouth once daily.    tiZANidine (ZANAFLEX) 4 MG tablet Take 1 tablet (4 mg total) by mouth once daily.    [DISCONTINUED] allopurinoL (ZYLOPRIM) 100 MG tablet Take 1 tablet (100 mg total) by mouth once daily.    [DISCONTINUED] lisinopriL (PRINIVIL,ZESTRIL) 20 MG tablet Take 1 tablet (20 mg total) by mouth once daily.    [DISCONTINUED] paroxetine (PAXIL) 20 MG tablet Take 1 tablet (20 mg total) by mouth once daily.    [DISCONTINUED] triamterene-hydrochlorothiazide 37.5-25 mg (DYAZIDE) 37.5-25 mg per capsule Take 1 capsule by mouth once daily. Limited refill until follow up     No current facility-administered medications on file prior to visit.       Review of patient's allergies indicates:   Allergen Reactions    Penicillins Other (See Comments)     "lumps under tongue"       OBJECTIVE:     Vital Signs:  Vitals:    11/17/23 1515   BP: 102/60   Pulse: 74   SpO2: (!) 84%   Weight: 127.7 kg (281 lb 8.4 oz)   Height: 5' 6" (1.676 m)       Body mass index is 45.44 kg/m².     Physical Exam  Constitutional:       Appearance: Normal appearance. She is obese.   Cardiovascular:      Rate and Rhythm: Normal rate and regular rhythm.      Pulses: Normal pulses.      Heart sounds: Normal heart sounds.   Pulmonary:      Effort: Pulmonary effort is normal.      Breath sounds: Normal breath sounds.   Abdominal:      Palpations: " "Abdomen is soft.   Musculoskeletal:      Comments: Limited mobility due to arthritis   Neurological:      General: No focal deficit present.      Mental Status: She is oriented to person, place, and time.   Psychiatric:         Behavior: Behavior normal.         Laboratory  Lab Results   Component Value Date    WBC 7.73 11/17/2023    HGB 13.9 11/17/2023    HCT 42.8 11/17/2023    MCV 97 11/17/2023     11/17/2023     BMP  Lab Results   Component Value Date     11/17/2023    K 5.1 11/17/2023     11/17/2023    CO2 25 11/17/2023    BUN 54 (H) 11/17/2023    CREATININE 2.4 (H) 11/17/2023    CALCIUM 9.5 11/17/2023    ANIONGAP 11 11/17/2023    EGFRNORACEVR 19.9 (A) 11/17/2023     No results found for: "INR", "PROTIME"  Lab Results   Component Value Date    HGBA1C 5.4 01/14/2022         Health Maintenance Due   Topic Date Due    TETANUS VACCINE  Never done    Shingles Vaccine (1 of 2) Never done    RSV Vaccine (Age 60+) (1 - 1-dose 60+ series) Never done    Influenza Vaccine (1) 09/01/2023    COVID-19 Vaccine (3 - 2023-24 season) 09/01/2023    DEXA Scan  11/13/2023          HEALTH MAINTENANCE:   Immunizations:   Influenza: received today      ASSESSMENT & PLAN:   Ms. Roxanne Caballero is a 78 y.o. female w/PMH of HTN, hypothyroidism, SABRINA, morbid obesity, and gout who presents to the clinic to establish care and medication refills.       Hypertensive kidney disease with stage 4 chronic kidney disease  -     lisinopriL (PRINIVIL,ZESTRIL) 20 MG tablet; Take 1 tablet (20 mg total) by mouth once daily.  Dispense: 90 tablet; Refill: 3  -     allopurinoL (ZYLOPRIM) 100 MG tablet; Take 1 tablet (100 mg total) by mouth once daily.  Dispense: 90 tablet; Refill: 0  -     triamterene-hydrochlorothiazide 37.5-25 mg (DYAZIDE) 37.5-25 mg per capsule; Take 1 capsule by mouth once daily. Limited refill until follow up  Dispense: 30 capsule; Refill: 0    Gout, unspecified cause, unspecified chronicity, unspecified site  -     " allopurinoL (ZYLOPRIM) 100 MG tablet; Take 1 tablet (100 mg total) by mouth once daily.  Dispense: 90 tablet; Refill: 0    Anxiety  -     Discontinue: paroxetine (PAXIL) 20 MG tablet; Take 1 tablet (20 mg total) by mouth 2 (two) times a day.  Dispense: 90 tablet; Refill: 3  -     Discontinue: paroxetine (PAXIL) 20 MG tablet; Take 1 tablet (20 mg total) by mouth 2 (two) times a day. for 10 days  Dispense: 20 tablet; Refill: 0  -     Discontinue: paroxetine (PAXIL) 20 MG tablet; Take 1 tablet (20 mg total) by mouth 2 (two) times a day. for 10 days  Dispense: 20 tablet; Refill: 0  -     paroxetine (PAXIL) 20 MG tablet; Take 1 tablet (20 mg total) by mouth 2 (two) times a day.  Dispense: 90 tablet; Refill: 3  -     paroxetine (PAXIL) 20 MG tablet; Take 1 tablet (20 mg total) by mouth 2 (two) times a day. for 10 days  Dispense: 20 tablet; Refill: 0        Return to clinic in 1 year or sooner as needed.    Patient was discussed with Dr. Chinchilla.     Melba Lorenzo MD  Internal Medicine, PGY-1  [facility]

## 2023-11-21 DIAGNOSIS — N18.4 HYPERTENSIVE KIDNEY DISEASE WITH STAGE 4 CHRONIC KIDNEY DISEASE: ICD-10-CM

## 2023-11-21 DIAGNOSIS — I12.9 HYPERTENSIVE KIDNEY DISEASE WITH STAGE 4 CHRONIC KIDNEY DISEASE: ICD-10-CM

## 2023-11-21 RX ORDER — TRIAMTERENE AND HYDROCHLOROTHIAZIDE 37.5; 25 MG/1; MG/1
1 CAPSULE ORAL DAILY
Qty: 30 CAPSULE | Refills: 0 | Status: SHIPPED | OUTPATIENT
Start: 2023-11-21 | End: 2023-12-01 | Stop reason: SDUPTHER

## 2023-11-21 NOTE — TELEPHONE ENCOUNTER
Refill Routing Note   Medication(s) are not appropriate for processing by [facility] for the following reason(s):        Non-participating provider    [facility] action(s):  Route               Appointments  past 12m or future 3m with PCP    Date Provider   Last Visit   11/17/2023 Melba Lorenzo MD   Next Visit   Visit date not found Melba Lorenzo MD   ED visits in past 90 days: 0        Note composed:9:41 AM 11/21/2023

## 2023-11-27 ENCOUNTER — TELEPHONE (OUTPATIENT)
Dept: INTERNAL MEDICINE | Facility: CLINIC | Age: 78
End: 2023-11-27
Payer: MEDICARE

## 2023-11-27 NOTE — TELEPHONE ENCOUNTER
----- Message from Elaina Hines sent at 11/27/2023 11:11 AM CST -----  Contact: [facility] Review 0   Pharmacy is calling to clarify an RX.  RX name:  paroxetine (PAXIL) 20 MG tablet  What do they need to clarify:  PA is needed and a fax will be sent to the office  Comments:

## 2023-11-28 NOTE — PROGRESS NOTES
I have reviewed the notes, assessments, and/or procedures performed by Dr. Lorenzo , I concur with her documentation of Roxanne Caballero.  Date of Service: 11/17/2023.  Pt. With Stage 4 CKD, will repeat BMP to assess progression and referral to nephrology for follow up.  Pt. With history of Gout and denies any recent flares.  Will check uric acid level and consider changing the diuretic as this may contribute to her gout.  Will optimize blood pressure and weight management.  Given CKD would not increase dose of Paxil until BMP results to assess renal status.

## 2023-12-01 DIAGNOSIS — I12.9 HYPERTENSIVE KIDNEY DISEASE WITH STAGE 4 CHRONIC KIDNEY DISEASE: ICD-10-CM

## 2023-12-01 DIAGNOSIS — M10.9 GOUT, UNSPECIFIED CAUSE, UNSPECIFIED CHRONICITY, UNSPECIFIED SITE: ICD-10-CM

## 2023-12-01 DIAGNOSIS — N18.4 HYPERTENSIVE KIDNEY DISEASE WITH STAGE 4 CHRONIC KIDNEY DISEASE: ICD-10-CM

## 2023-12-02 NOTE — TELEPHONE ENCOUNTER
Refill Routing Note   Medication(s) are not appropriate for processing by [facility] for the following reason(s):        Non-participating provider    [facility] action(s):  Route               Appointments  past 12m or future 3m with PCP    Date Provider   Last Visit   11/17/2023 Melba Lorenzo MD   Next Visit   Visit date not found Melba Lorenzo MD   ED visits in past 90 days: 0        Note composed:9:31 AM 12/02/2023

## 2023-12-04 ENCOUNTER — TELEPHONE (OUTPATIENT)
Dept: INTERNAL MEDICINE | Facility: CLINIC | Age: 78
End: 2023-12-04
Payer: MEDICARE

## 2023-12-04 NOTE — TELEPHONE ENCOUNTER
Spoke w/ [employer] rep and provided the fax number for them to send a prior auth for the provider. He understood.

## 2023-12-04 NOTE — TELEPHONE ENCOUNTER
----- Message from Jaymie Gomez sent at 11/27/2023 12:40 PM CST -----  Contact: [phone]  Terri Fuchs is calling in regards to the prior auth for the pt for [employer] please advise    Ref [id number]

## 2023-12-06 RX ORDER — ALLOPURINOL 100 MG/1
100 TABLET ORAL DAILY
Qty: 90 TABLET | Refills: 11 | Status: SHIPPED | OUTPATIENT
Start: 2023-12-06

## 2023-12-06 RX ORDER — TRIAMTERENE AND HYDROCHLOROTHIAZIDE 37.5; 25 MG/1; MG/1
1 CAPSULE ORAL DAILY
Qty: 90 CAPSULE | Refills: 11 | Status: SHIPPED | OUTPATIENT
Start: 2023-12-06

## 2024-04-22 DIAGNOSIS — F41.9 ANXIETY: ICD-10-CM

## 2024-04-22 NOTE — TELEPHONE ENCOUNTER
Refill Routing Note   Medication(s) are not appropriate for processing by [facility] for the following reason(s):        Non-participating provider    [facility] action(s):  Route               Appointments  past 12m or future 3m with PCP    Date Provider   Last Visit   11/17/2023 Melba Lorenzo MD   Next Visit   Visit date not found eMlba Lorenzo MD   ED visits in past 90 days: 0        Note composed:10:51 AM 04/22/2024

## 2024-04-25 DIAGNOSIS — F41.9 ANXIETY: ICD-10-CM

## 2024-05-03 DIAGNOSIS — F41.9 ANXIETY: ICD-10-CM

## 2024-05-03 NOTE — TELEPHONE ENCOUNTER
----- Message from Alanna Sheikh sent at 5/2/2024  9:58 AM CDT -----  Contact: [phone]  Requesting an RX refill or new RX.  Is this a refill or new RX: refill  RX name and strength (copy/paste from chart):  paroxetine (PAXIL) 40 MG tablet  Is this a 30 day or 90 day RX: 90  Pharmacy name and phone # (copy/paste from chart):    [facility] - [facility] [address]  [address] [address]  Phone: [phone] Fax: [phone]    The doctors have asked that we provide their patients with the following 2 reminders -- prescription refills can take up to 72 hours, and a friendly reminder that in the future you can use your [facility] account to request refills: call back     Pt said she needs a PA

## 2024-05-07 RX ORDER — PAROXETINE HYDROCHLORIDE 40 MG/1
40 TABLET, FILM COATED ORAL DAILY
Qty: 90 TABLET | Refills: 1 | Status: SHIPPED | OUTPATIENT
Start: 2024-05-07

## 2024-05-07 RX ORDER — PAROXETINE HYDROCHLORIDE 40 MG/1
40 TABLET, FILM COATED ORAL
Qty: 90 TABLET | Refills: 3 | Status: SHIPPED | OUTPATIENT
Start: 2024-05-07

## 2024-05-27 RX ORDER — PAROXETINE HYDROCHLORIDE 40 MG/1
40 TABLET, FILM COATED ORAL DAILY
Qty: 90 TABLET | Refills: 1 | Status: SHIPPED | OUTPATIENT
Start: 2024-05-27

## 2024-06-28 ENCOUNTER — PATIENT OUTREACH (OUTPATIENT)
Dept: ADMINISTRATIVE | Facility: OTHER | Age: 79
End: 2024-06-28

## 2024-06-28 ENCOUNTER — TELEPHONE (OUTPATIENT)
Dept: FAMILY MEDICINE | Facility: CLINIC | Age: 79
End: 2024-06-28
Payer: MEDICARE

## 2024-06-28 NOTE — TELEPHONE ENCOUNTER
Attempted to call pt to confirm appointment, but voicemail box was not set up. Pt was unavailable.

## 2024-06-28 NOTE — PROGRESS NOTES
CHW - Outreach Attempt    Community Health Worker left a In Basket message for 1st attempt to contact patient regarding: SDOH  Community Health Worker to attempt to contact patient on: 7/12  CHW - Outreach Attempt    Community Health Worker could not leave a voicemail message for 2nd attempt to contact patient regarding: SDOH  Community Health Worker to attempt to contact patient on: 7/29  CHW - Outreach Attempt    Community Health Worker could not leave a voicemail message for 3rd attempt to contact patient regarding: [facility]  Community Health Worker to attempt to contact patient on: none  CHW - Unable to Contact    Community Health Worker to close episode at this time due to three missed attempts for patient contact.

## 2024-07-19 ENCOUNTER — OFFICE VISIT (OUTPATIENT)
Dept: PRIMARY CARE CLINIC | Facility: CLINIC | Age: 79
End: 2024-07-19
Payer: MEDICARE

## 2024-07-19 VITALS — DIASTOLIC BLOOD PRESSURE: 60 MMHG | HEART RATE: 58 BPM | SYSTOLIC BLOOD PRESSURE: 100 MMHG | OXYGEN SATURATION: 98 %

## 2024-07-19 DIAGNOSIS — I12.9 HYPERTENSIVE KIDNEY DISEASE WITH STAGE 4 CHRONIC KIDNEY DISEASE: ICD-10-CM

## 2024-07-19 DIAGNOSIS — N18.4 HYPERTENSIVE KIDNEY DISEASE WITH STAGE 4 CHRONIC KIDNEY DISEASE: ICD-10-CM

## 2024-07-19 DIAGNOSIS — M19.90 ARTHRITIS: ICD-10-CM

## 2024-07-19 DIAGNOSIS — E74.819 DISORDERS OF GLUCOSE TRANSPORT, UNSPECIFIED: ICD-10-CM

## 2024-07-19 DIAGNOSIS — E03.9 HYPOTHYROIDISM, UNSPECIFIED TYPE: ICD-10-CM

## 2024-07-19 DIAGNOSIS — M10.9 GOUT, UNSPECIFIED CAUSE, UNSPECIFIED CHRONICITY, UNSPECIFIED SITE: ICD-10-CM

## 2024-07-19 DIAGNOSIS — J44.9 CHRONIC OBSTRUCTIVE PULMONARY DISEASE, UNSPECIFIED COPD TYPE: ICD-10-CM

## 2024-07-19 DIAGNOSIS — E66.01 CLASS 3 SEVERE OBESITY DUE TO EXCESS CALORIES WITH SERIOUS COMORBIDITY AND BODY MASS INDEX (BMI) OF 45.0 TO 49.9 IN ADULT: ICD-10-CM

## 2024-07-19 DIAGNOSIS — F41.1 GENERALIZED ANXIETY DISORDER: Primary | ICD-10-CM

## 2024-07-19 PROCEDURE — 99999 PR PBB SHADOW E&M-EST. PATIENT-LVL III: CPT | Mod: PBBFAC,HCNC,, | Performed by: INTERNAL MEDICINE

## 2024-07-19 PROCEDURE — 1125F AMNT PAIN NOTED PAIN PRSNT: CPT | Mod: HCNC,CPTII,S$GLB, | Performed by: INTERNAL MEDICINE

## 2024-07-19 PROCEDURE — 99215 OFFICE O/P EST HI 40 MIN: CPT | Mod: HCNC,S$GLB,, | Performed by: INTERNAL MEDICINE

## 2024-07-19 PROCEDURE — 3288F FALL RISK ASSESSMENT DOCD: CPT | Mod: HCNC,CPTII,S$GLB, | Performed by: INTERNAL MEDICINE

## 2024-07-19 PROCEDURE — 3074F SYST BP LT 130 MM HG: CPT | Mod: HCNC,CPTII,S$GLB, | Performed by: INTERNAL MEDICINE

## 2024-07-19 PROCEDURE — 1159F MED LIST DOCD IN RCRD: CPT | Mod: HCNC,CPTII,S$GLB, | Performed by: INTERNAL MEDICINE

## 2024-07-19 PROCEDURE — 1101F PT FALLS ASSESS-DOCD LE1/YR: CPT | Mod: HCNC,CPTII,S$GLB, | Performed by: INTERNAL MEDICINE

## 2024-07-19 PROCEDURE — 1158F ADVNC CARE PLAN TLK DOCD: CPT | Mod: HCNC,CPTII,S$GLB, | Performed by: INTERNAL MEDICINE

## 2024-07-19 PROCEDURE — 3078F DIAST BP <80 MM HG: CPT | Mod: HCNC,CPTII,S$GLB, | Performed by: INTERNAL MEDICINE

## 2024-07-19 PROCEDURE — 1160F RVW MEDS BY RX/DR IN RCRD: CPT | Mod: HCNC,CPTII,S$GLB, | Performed by: INTERNAL MEDICINE

## 2024-07-19 RX ORDER — NABUMETONE 750 MG/1
750 TABLET, FILM COATED ORAL DAILY
Qty: 90 TABLET | Refills: 3 | Status: SHIPPED | OUTPATIENT
Start: 2024-07-19

## 2024-07-19 RX ORDER — MULTIVITAMIN
1 TABLET ORAL DAILY
COMMUNITY

## 2024-07-19 RX ORDER — PREDNISONE 20 MG/1
20 TABLET ORAL DAILY
Qty: 10 TABLET | Refills: 0 | Status: SHIPPED | OUTPATIENT
Start: 2024-07-19

## 2024-07-19 RX ORDER — AZITHROMYCIN 1 G/1
1 POWDER, FOR SUSPENSION ORAL ONCE
Qty: 1 PACKET | Refills: 0 | Status: SHIPPED | OUTPATIENT
Start: 2024-07-19 | End: 2024-07-19

## 2024-07-19 RX ORDER — BENZONATATE 200 MG/1
200 CAPSULE ORAL 3 TIMES DAILY PRN
Qty: 30 CAPSULE | Refills: 0 | Status: SHIPPED | OUTPATIENT
Start: 2024-07-19 | End: 2024-07-29

## 2024-07-19 RX ORDER — FLUTICASONE PROPIONATE 50 MCG
1 SPRAY, SUSPENSION (ML) NASAL DAILY
Qty: 5 ML | Refills: 3 | Status: SHIPPED | OUTPATIENT
Start: 2024-07-19 | End: 2024-07-24

## 2024-07-19 NOTE — PROGRESS NOTES
Primary Care Provider Appointment   [facility]        Subjective:       Patient ID:  Roxanne is a 79 y.o. female being seen for [facility]      Chief Complaint: Establish Care    Ms. Roxanne Caballero is a 79 y.o. female with  HTN, hypothyroidism, SABRINA, morbid obesity, and gout who presents to the clinic to [facility].  She has been feeling ill in the last 3 days, with cold, cough, postnasal drip and congestion.  Her daughter is with her at the visit and she mentioned that she has multiple people in her office tested positive for COVID.  Patient is due for her annual labs  She needs refills of her medications  She was following a Rheumatology in the past for her arthritis, she mentioned that her rheumatologist retired and she does not have anyone    Cough  This is a new problem. The current episode started in the past 7 days. The problem has been unchanged. The problem occurs every few minutes. The cough is Productive of sputum. Associated symptoms include ear pain, headaches, myalgias, postnasal drip, rhinorrhea and shortness of breath. Pertinent negatives include no chest pain. Risk factors for lung disease include smoking/tobacco exposure. She has tried OTC cough suppressant and rest for the symptoms. The treatment provided no relief. Her past medical history is significant for COPD.       Past Medical History:   Diagnosis Date    Anxiety     Arthritis     Bilateral primary osteoarthritis of knee 10/23/2008    Right worse than left    Cervical stenosis of spine 01/02/2009    Emphysema of lung     Hyperlipidemia     Hypertension     Hypothyroidism     Osteoarthritis of left hip 10/23/2008    Osteoarthritis of lumbar spine 10/23/2008       Review of Systems   HENT:  Positive for ear pain, postnasal drip and rhinorrhea.    Respiratory:  Positive for cough and shortness of breath.    Cardiovascular:  Negative for chest pain.   Musculoskeletal:  Positive for myalgias.    Neurological:  Positive for headaches.   Psychiatric/Behavioral:  Positive for depression.                Health Maintenance         Date Due Completion Date    TETANUS VACCINE Never done ---    Shingles Vaccine (1 of 2) Never done ---    RSV Vaccine (Age 60+ and Pregnant patients) (1 - 1-dose 60+ series) Never done ---    COVID-19 Vaccine (3 - 2023-24 season) 09/01/2023 12/22/2021    DEXA Scan 11/13/2023 11/13/2020 (Done)    Override on 11/13/2020: Done    Influenza Vaccine (1) 09/01/2024 11/17/2023    Lipid Panel 11/17/2028 11/17/2023            Advance Care Planning     Date: 07/19/2024    Power of   I initiated the process of voluntary advance care planning today and explained the importance of this process to the patient.  I introduced the concept of advance directives to the patient, as well. Then the patient received detailed information about the importance of designating a Health Care Power of  (HCPOA). She was also instructed to communicate with this person about their wishes for future healthcare, should she become sick and lose decision-making capacity. The patient has not previously appointed a HCPOA. After our discussion, the patient has decided to complete a HCPOA and has appointed her daughter, health care agent: Idalmis Caballero & health care agent number: [phone]. I encouraged her to communicate with this person about their wishes for future healthcare, should she become sick and lose decision-making capacity.      A total of 5 min was spent on advance care planning, goals of care discussion, emotional support, formulating and communicating prognosis and exploring burden/benefit of various approaches of treatment. This discussion occurred on a fully voluntary basis with the verbal consent of the patient and/or family.         Objective:      Vitals:    07/19/24 1321   BP: 100/60   BP Location: Right arm   Patient Position: Sitting   BP Method: Medium (Manual)   Pulse: (!) 58  "  SpO2: 98%     Estimated body mass index is 45.44 kg/m² as calculated from the following:    Height as of 11/17/23: 5' 6" (1.676 m).    Weight as of 11/17/23: 127.7 kg (281 lb 8.4 oz).  Physical Exam  Constitutional:       Appearance: Normal appearance. She is obese.      Comments: Has walker with her    HENT:      Head: Normocephalic and atraumatic.      Nose: Nose normal.      Mouth/Throat:      Mouth: Mucous membranes are moist.   Eyes:      Pupils: Pupils are equal, round, and reactive to light.   Cardiovascular:      Rate and Rhythm: Normal rate and regular rhythm.      Pulses: Normal pulses.      Heart sounds: Normal heart sounds. No murmur heard.  Pulmonary:      Effort: Pulmonary effort is normal.      Breath sounds: Normal breath sounds.   Abdominal:      General: Bowel sounds are normal.      Palpations: Abdomen is soft.   Skin:     General: Skin is warm.   Neurological:      General: No focal deficit present.      Mental Status: She is alert and oriented to person, place, and time.      Cranial Nerves: No cranial nerve deficit.   Psychiatric:         Mood and Affect: Mood normal.           Assessment and Plan:         1. Generalized anxiety disorder  -     CBC Auto Differential; Standing  -     Comprehensive Metabolic Panel; Standing  -     TSH; Standing  -     Lipid Panel; Standing  -     Hemoglobin A1C; Standing    2. Hypothyroidism, unspecified type  -     CBC Auto Differential; Standing  -     Comprehensive Metabolic Panel; Standing  -     TSH; Standing  -     Lipid Panel; Standing  -     Hemoglobin A1C; Standing    3. Class 3 severe obesity due to excess calories with serious comorbidity and body mass index (BMI) of 45.0 to 49.9 in adult  -     CBC Auto Differential; Standing  -     Comprehensive Metabolic Panel; Standing  -     TSH; Standing  -     Lipid Panel; Standing  -     Hemoglobin A1C; Standing    4. Gout, unspecified cause, unspecified chronicity, unspecified site  -     URIC ACID; Future; " Expected date: 07/19/2024    5. Disorders of glucose transport, unspecified  -     Hemoglobin A1C; Standing    6. Chronic obstructive pulmonary disease, unspecified COPD type    7. Hypertensive kidney disease with stage 4 chronic kidney disease    8. Arthritis  -     Ambulatory referral/consult to Rheumatology; Future; Expected date: 07/26/2024    Other orders  -     nabumetone (RELAFEN) 750 MG tablet; Take 1 tablet (750 mg total) by mouth once daily.  Dispense: 90 tablet; Refill: 3  -     fluticasone propionate (FLONASE) 50 mcg/actuation nasal spray; 1 spray (50 mcg total) by Each Nostril route once daily. for 5 days  Dispense: 5 mL; Refill: 3  -     benzonatate (TESSALON) 200 MG capsule; Take 1 capsule (200 mg total) by mouth 3 (three) times daily as needed for Cough.  Dispense: 30 capsule; Refill: 0  -     predniSONE (DELTASONE) 20 MG tablet; Take 1 tablet (20 mg total) by mouth once daily.  Dispense: 10 tablet; Refill: 0  -     azithromycin (ZITHROMAX) 1 gram Pack; Take 1 g by mouth once. for 1 dose  Dispense: 1 packet; Refill: 0         Follow Up:   Follow up in about 3 months (around 10/19/2024).        Dr. Shanti Akasapu [facility] [place]

## 2024-07-26 ENCOUNTER — NURSE TRIAGE (OUTPATIENT)
Dept: ADMINISTRATIVE | Facility: CLINIC | Age: 79
End: 2024-07-26
Payer: MEDICARE

## 2024-07-26 NOTE — TELEPHONE ENCOUNTER
Daughter calling, pt has been sick x's 2 weeks. Was seen last Friday at PCP office on 7/19. Pt was prescribed azithromycin and cough medication and prednisone. Daughter reports worsening symptoms. Symptoms are shooting pains in sides and difficulty breathing. Hx of emphysema.     Dispo is to call  now. Daughter v/u.     Reason for Disposition   SEVERE difficulty breathing (e.g., struggling for each breath, speaks in single words)    Protocols used: Breathing Difficulty-A-AH

## 2024-07-29 ENCOUNTER — TELEPHONE (OUTPATIENT)
Dept: PRIMARY CARE CLINIC | Facility: CLINIC | Age: 79
End: 2024-07-29
Payer: MEDICARE

## 2024-07-29 NOTE — TELEPHONE ENCOUNTER
Please call to check on her and schedule her for a hosp follow up with her PCP or care team, thank you!

## 2024-07-29 NOTE — TELEPHONE ENCOUNTER
Patient Calls  (Newest Message First)  View All Conversations on this Encounter   Shikha Joshi, STEPHEN Conteh Staff3 days ago     ADELE Moore, please see nurse on call note & f/u with pt directly.     Shikha Joshi RN3 days ago     AJ  Daughter calling, pt has been sick x's 2 weeks. Was seen last Friday at PCP office on 7/19. Pt was prescribed azithromycin and cough medication and prednisone. Daughter reports worsening symptoms. Symptoms are shooting pains in sides and difficulty breathing. Hx of emphysema.      Dispo is to call  now. Daughter v/u.      Reason for Disposition   SEVERE difficulty breathing (e.g., struggling for each breath, speaks in single words)    Protocols used: Breathing Difficulty-A-           Note     Idalmis Caballero [phone]  Shikha Joshi RN

## 2024-07-30 NOTE — TELEPHONE ENCOUNTER
Call made to daughter to check on patient. Pt is admitted to [facility].

## 2024-08-05 ENCOUNTER — TELEPHONE (OUTPATIENT)
Dept: PRIMARY CARE CLINIC | Facility: CLINIC | Age: 79
End: 2024-08-05
Payer: MEDICARE

## 2024-08-05 DIAGNOSIS — E66.01 CLASS 3 SEVERE OBESITY DUE TO EXCESS CALORIES WITH SERIOUS COMORBIDITY AND BODY MASS INDEX (BMI) OF 45.0 TO 49.9 IN ADULT: ICD-10-CM

## 2024-08-05 DIAGNOSIS — M25.559 ARTHRALGIA OF HIP, UNSPECIFIED LATERALITY: Primary | ICD-10-CM

## 2024-08-05 DIAGNOSIS — F41.1 GENERALIZED ANXIETY DISORDER: ICD-10-CM

## 2024-08-07 ENCOUNTER — PATIENT MESSAGE (OUTPATIENT)
Dept: ADMINISTRATIVE | Facility: CLINIC | Age: 79
End: 2024-08-07
Payer: MEDICARE

## 2024-08-07 ENCOUNTER — PATIENT OUTREACH (OUTPATIENT)
Dept: ADMINISTRATIVE | Facility: CLINIC | Age: 79
End: 2024-08-07
Payer: MEDICARE

## 2024-08-09 ENCOUNTER — TELEPHONE (OUTPATIENT)
Dept: PRIMARY CARE CLINIC | Facility: CLINIC | Age: 79
End: 2024-08-09
Payer: MEDICARE

## 2024-08-09 DIAGNOSIS — E03.9 HYPOTHYROIDISM, UNSPECIFIED TYPE: ICD-10-CM

## 2024-08-09 NOTE — TELEPHONE ENCOUNTER
Refill Routing Note   Medication(s) are not appropriate for processing by [facility] for the following reason(s):        Required labs abnormal  Responsible provider unclear    [facility] action(s):  Defer             Appointments  past 12m or future 3m with PCP    Date Provider   Last Visit   1/14/2022 Vika Marcial MD   Next Visit   Visit date not found Vika Marcial MD   ED visits in past 90 days: 0        Note composed:2:49 PM 08/09/2024

## 2024-08-12 RX ORDER — LEVOTHYROXINE SODIUM 150 UG/1
150 TABLET ORAL DAILY
Qty: 90 TABLET | Refills: 3 | Status: SHIPPED | OUTPATIENT
Start: 2024-08-12

## 2024-08-13 ENCOUNTER — TELEPHONE (OUTPATIENT)
Dept: PRIMARY CARE CLINIC | Facility: CLINIC | Age: 79
End: 2024-08-13
Payer: MEDICARE

## 2024-08-13 NOTE — TELEPHONE ENCOUNTER
----- Message from Elaina Hines sent at 8/13/2024  3:06 PM CDT -----  Contact: [phone] Perry with [facility]  Patient would like to get medical advice.  Symptoms (please be specific):   Perry states the pt is continuing to have low b/p readings and not getting much physical therapy. Perry also states the pt had a fall this morning and the paramedics had to get the pt back into bed.   How long have you had these symptoms: N/A  Would you like a call back, or a response through your [facility] portal?:   call back to the pt or her daughter.   Pharmacy name and phone # (copy from chart):   N/A  Comments:

## 2024-08-13 NOTE — TELEPHONE ENCOUNTER
Talked to pt daughter on the phone. Stated pt has had improvements in PT, and is getting stronger. Stated she had a fall this morning. Daughter could not lift pt back into the bed by herself so EMS was called.     Daughter denied the offer to schedule an appointment. Also denied offer of a virtual visit. Stated she would call back when necessary if pt feels she should be seen.     Daughter also inquired about medications given to pt in the hospital. We could not figure out which medications she wanted to ask about. Advised to call back or message with any questions when she find the medications.

## 2024-08-14 ENCOUNTER — TELEPHONE (OUTPATIENT)
Dept: PRIMARY CARE CLINIC | Facility: CLINIC | Age: 79
End: 2024-08-14
Payer: MEDICARE

## 2024-08-14 NOTE — TELEPHONE ENCOUNTER
----- Message from Mala Herrera sent at 8/14/2024  2:52 PM CDT -----  Regarding: home care form  Placed  [facility] form in dr Bangura's in box,  orders  need doctor's signature  dropped off at  thanks ! (In a red folder)

## 2024-08-20 ENCOUNTER — TELEPHONE (OUTPATIENT)
Dept: PRIMARY CARE CLINIC | Facility: CLINIC | Age: 79
End: 2024-08-20
Payer: MEDICARE

## 2024-08-20 NOTE — TELEPHONE ENCOUNTER
----- Message from Gloria Meyers sent at 8/20/2024  4:47 PM CDT -----  Contact: [phone]  Perry @ [phone] with Home health PT states 78/52 BP  after drinking and exercise was  85/72 BP not really symptomatic. Please call and advise.    Thank you and have a great day.

## 2024-08-21 NOTE — TELEPHONE ENCOUNTER
Spoke to home health nurse Perry. Perry explained that patient had water next to her and drank water during Home PT visit. Perry stated that this happened at visit last week also. Nurse to send to provider for review of current medications. Patient on blood pressure med and a combo diuretic.

## 2024-08-23 ENCOUNTER — TELEPHONE (OUTPATIENT)
Dept: PRIMARY CARE CLINIC | Facility: CLINIC | Age: 79
End: 2024-08-23
Payer: MEDICARE

## 2024-08-23 NOTE — TELEPHONE ENCOUNTER
----- Message from Jennifer Tamayo sent at 8/23/2024 12:53 PM CDT -----  Contact: Mary with [facility] [phone]  [email or web address]     Patient is calling for Medical Advice regarding:Mary with [facility] [phone] calling about a sign DME order the signature is cut off please resend to her.Fax [phone]    How long has patient had these symptoms:    Pharmacy name and phone#:    Patient wants a call back or thru [facility]:callback/    Comments:    Please advise patient replies from provider may take up to 48 hours.

## 2024-08-27 ENCOUNTER — TELEPHONE (OUTPATIENT)
Dept: PRIMARY CARE CLINIC | Facility: CLINIC | Age: 79
End: 2024-08-27
Payer: MEDICARE

## 2024-08-27 NOTE — TELEPHONE ENCOUNTER
----- Message from Gloria Meyers sent at 8/27/2024  4:25 PM CDT -----  Contact: [phone]  Perry @ [phone] PT states fall on 8/23/24 EMS was called for lift assistance patient is complaining of some back pain. Please call and advise.    Thank you and have a great day.

## 2024-08-28 NOTE — TELEPHONE ENCOUNTER
Spoke to home health PT Perry who said Pt had fallen and he went to see her and she refused due to other issues going on. He is going out tomorrow to work with her. Nurse attempted to call patient and got busy signal. Nurse left voicemail for daughter with callback number.

## 2024-09-28 ENCOUNTER — TELEPHONE (OUTPATIENT)
Dept: FAMILY MEDICINE | Facility: CLINIC | Age: 79
End: 2024-09-28
Payer: MEDICARE

## 2024-09-28 NOTE — TELEPHONE ENCOUNTER
Patient transitioned primary care. She has upcoming visit. Please schedule the prelabs from the available orders. Thanks!    Future Appointments   Date Time Provider Department Center   10/25/2024  2:00 PM Yady Bangura MD [facility] [place]

## 2024-09-30 ENCOUNTER — PATIENT MESSAGE (OUTPATIENT)
Dept: INTERNAL MEDICINE | Facility: CLINIC | Age: 79
End: 2024-09-30
Payer: MEDICARE

## 2024-10-08 ENCOUNTER — TELEPHONE (OUTPATIENT)
Dept: PRIMARY CARE CLINIC | Facility: CLINIC | Age: 79
End: 2024-10-08
Payer: MEDICARE

## 2024-10-08 NOTE — TELEPHONE ENCOUNTER
Spoke to Pt's daughter regarding a sore on patients heel. She described it as red/brown and peeling in a [place]. Attempted to get pt scheduled for a virtual appointment but none of the appt times would work as daughter would be at work. Daughter is going to try to send pictures in patient portal.

## 2024-10-08 NOTE — TELEPHONE ENCOUNTER
----- Message from Gloria sent at 10/8/2024  1:33 PM CDT -----  Contact: [phone]  Patient was released from hosp middle of August family daughter Idalmis @ [phone] has question about medication an spot on foot. Family states they can not move patient easily and would like a Virtual appt before 10/25/24.  Please call and advise.    Thank you and have a great day.

## 2024-10-15 ENCOUNTER — TELEPHONE (OUTPATIENT)
Dept: PRIMARY CARE CLINIC | Facility: CLINIC | Age: 79
End: 2024-10-15
Payer: MEDICARE

## 2024-12-13 ENCOUNTER — PATIENT MESSAGE (OUTPATIENT)
Dept: PRIMARY CARE CLINIC | Facility: CLINIC | Age: 79
End: 2024-12-13
Payer: MEDICARE

## 2025-01-14 DIAGNOSIS — Z00.00 ENCOUNTER FOR MEDICARE ANNUAL WELLNESS EXAM: ICD-10-CM

## 2025-04-10 ENCOUNTER — TELEPHONE (OUTPATIENT)
Dept: PRIMARY CARE CLINIC | Facility: CLINIC | Age: 80
End: 2025-04-10
Payer: MEDICARE

## 2025-08-26 ENCOUNTER — TELEPHONE (OUTPATIENT)
Dept: PRIMARY CARE CLINIC | Facility: CLINIC | Age: 80
End: 2025-08-26
Payer: MEDICARE